# Patient Record
Sex: FEMALE | Race: WHITE | NOT HISPANIC OR LATINO
[De-identification: names, ages, dates, MRNs, and addresses within clinical notes are randomized per-mention and may not be internally consistent; named-entity substitution may affect disease eponyms.]

---

## 2017-11-20 ENCOUNTER — APPOINTMENT (OUTPATIENT)
Dept: UROGYNECOLOGY | Facility: CLINIC | Age: 52
End: 2017-11-20
Payer: COMMERCIAL

## 2017-11-20 VITALS
HEIGHT: 62 IN | BODY MASS INDEX: 36.62 KG/M2 | DIASTOLIC BLOOD PRESSURE: 88 MMHG | SYSTOLIC BLOOD PRESSURE: 160 MMHG | WEIGHT: 199 LBS

## 2017-11-20 LAB
BILIRUB UR QL STRIP: NORMAL
CLARITY UR: CLEAR
COLLECTION METHOD: NORMAL
GLUCOSE UR-MCNC: NORMAL
HCG UR QL: 0.2 EU/DL
HGB UR QL STRIP.AUTO: NORMAL
KETONES UR-MCNC: NORMAL
LEUKOCYTE ESTERASE UR QL STRIP: NORMAL
NITRITE UR QL STRIP: NORMAL
PH UR STRIP: 6
PROT UR STRIP-MCNC: NORMAL
SP GR UR STRIP: 1

## 2017-11-20 PROCEDURE — 51701 INSERT BLADDER CATHETER: CPT

## 2017-11-20 PROCEDURE — 99244 OFF/OP CNSLTJ NEW/EST MOD 40: CPT | Mod: 25

## 2017-11-21 ENCOUNTER — RESULT REVIEW (OUTPATIENT)
Age: 52
End: 2017-11-21

## 2017-11-21 ENCOUNTER — RECORD ABSTRACTING (OUTPATIENT)
Age: 52
End: 2017-11-21

## 2017-11-21 DIAGNOSIS — Z82.49 FAMILY HISTORY OF ISCHEMIC HEART DISEASE AND OTHER DISEASES OF THE CIRCULATORY SYSTEM: ICD-10-CM

## 2017-11-21 DIAGNOSIS — E03.9 HYPOTHYROIDISM, UNSPECIFIED: ICD-10-CM

## 2017-11-21 DIAGNOSIS — B97.7 PAPILLOMAVIRUS AS THE CAUSE OF DISEASES CLASSIFIED ELSEWHERE: ICD-10-CM

## 2017-11-21 DIAGNOSIS — Z83.42 FAMILY HISTORY OF FAMILIAL HYPERCHOLESTEROLEMIA: ICD-10-CM

## 2017-11-21 DIAGNOSIS — Z87.42 PERSONAL HISTORY OF OTHER DISEASES OF THE FEMALE GENITAL TRACT: ICD-10-CM

## 2017-11-21 DIAGNOSIS — Z86.39 PERSONAL HISTORY OF OTHER ENDOCRINE, NUTRITIONAL AND METABOLIC DISEASE: ICD-10-CM

## 2017-11-21 DIAGNOSIS — Z87.440 PERSONAL HISTORY OF URINARY (TRACT) INFECTIONS: ICD-10-CM

## 2017-11-21 DIAGNOSIS — Z83.49 FAMILY HISTORY OF OTHER ENDOCRINE, NUTRITIONAL AND METABOLIC DISEASES: ICD-10-CM

## 2017-11-21 DIAGNOSIS — Z86.19 PERSONAL HISTORY OF OTHER INFECTIOUS AND PARASITIC DISEASES: ICD-10-CM

## 2017-11-21 DIAGNOSIS — Z78.9 OTHER SPECIFIED HEALTH STATUS: ICD-10-CM

## 2017-11-21 LAB
APPEARANCE: CLEAR
BACTERIA: NEGATIVE
BILIRUBIN URINE: NEGATIVE
BLOOD URINE: NEGATIVE
COLOR: ABNORMAL
GLUCOSE QUALITATIVE U: NEGATIVE MG/DL
HYALINE CASTS: 1 /LPF
KETONES URINE: NEGATIVE
LEUKOCYTE ESTERASE URINE: NEGATIVE
MICROSCOPIC-UA: NORMAL
NITRITE URINE: NEGATIVE
PH URINE: 6
PROTEIN URINE: NEGATIVE MG/DL
RED BLOOD CELLS URINE: 2 /HPF
SPECIFIC GRAVITY URINE: 1.01
SQUAMOUS EPITHELIAL CELLS: 1 /HPF
UROBILINOGEN URINE: NEGATIVE MG/DL
WHITE BLOOD CELLS URINE: 0 /HPF

## 2017-11-21 RX ORDER — LEVOTHYROXINE SODIUM 0.05 MG/1
50 TABLET ORAL
Refills: 0 | Status: ACTIVE | COMMUNITY

## 2017-11-22 ENCOUNTER — OUTPATIENT (OUTPATIENT)
Dept: OUTPATIENT SERVICES | Facility: HOSPITAL | Age: 52
LOS: 1 days | End: 2017-11-22
Payer: COMMERCIAL

## 2017-11-22 ENCOUNTER — RESULT REVIEW (OUTPATIENT)
Age: 52
End: 2017-11-22

## 2017-11-22 ENCOUNTER — APPOINTMENT (OUTPATIENT)
Dept: UROGYNECOLOGY | Facility: CLINIC | Age: 52
End: 2017-11-22
Payer: COMMERCIAL

## 2017-11-22 DIAGNOSIS — Z90.49 ACQUIRED ABSENCE OF OTHER SPECIFIED PARTS OF DIGESTIVE TRACT: Chronic | ICD-10-CM

## 2017-11-22 DIAGNOSIS — Z01.818 ENCOUNTER FOR OTHER PREPROCEDURAL EXAMINATION: ICD-10-CM

## 2017-11-22 LAB — BACTERIA UR CULT: NORMAL

## 2017-11-22 PROCEDURE — A4562: CPT

## 2017-11-22 PROCEDURE — 57160 INSERT PESSARY/OTHER DEVICE: CPT

## 2017-11-24 DIAGNOSIS — N39.46 MIXED INCONTINENCE: ICD-10-CM

## 2017-11-24 DIAGNOSIS — N81.11 CYSTOCELE, MIDLINE: ICD-10-CM

## 2017-11-24 DIAGNOSIS — R10.2 PELVIC AND PERINEAL PAIN: ICD-10-CM

## 2017-11-24 DIAGNOSIS — R39.89 OTHER SYMPTOMS AND SIGNS INVOLVING THE GENITOURINARY SYSTEM: ICD-10-CM

## 2017-12-04 ENCOUNTER — APPOINTMENT (OUTPATIENT)
Dept: UROGYNECOLOGY | Facility: CLINIC | Age: 52
End: 2017-12-04
Payer: COMMERCIAL

## 2017-12-04 PROCEDURE — 99214 OFFICE O/P EST MOD 30 MIN: CPT | Mod: GC

## 2018-03-05 ENCOUNTER — APPOINTMENT (OUTPATIENT)
Dept: UROGYNECOLOGY | Facility: CLINIC | Age: 53
End: 2018-03-05
Payer: COMMERCIAL

## 2018-03-05 LAB
BILIRUB UR QL STRIP: NORMAL
CLARITY UR: CLEAR
COLLECTION METHOD: NORMAL
GLUCOSE UR-MCNC: NORMAL
HCG UR QL: 0.2 EU/DL
HGB UR QL STRIP.AUTO: NORMAL
KETONES UR-MCNC: NORMAL
LEUKOCYTE ESTERASE UR QL STRIP: NORMAL
NITRITE UR QL STRIP: NORMAL
PH UR STRIP: 5.5
PROT UR STRIP-MCNC: NORMAL
SP GR UR STRIP: 1.02

## 2018-03-05 PROCEDURE — 99214 OFFICE O/P EST MOD 30 MIN: CPT | Mod: 25

## 2018-03-05 PROCEDURE — 51701 INSERT BLADDER CATHETER: CPT

## 2018-08-20 ENCOUNTER — APPOINTMENT (OUTPATIENT)
Dept: ULTRASOUND IMAGING | Facility: IMAGING CENTER | Age: 53
End: 2018-08-20
Payer: COMMERCIAL

## 2018-08-20 ENCOUNTER — APPOINTMENT (OUTPATIENT)
Dept: UROGYNECOLOGY | Facility: CLINIC | Age: 53
End: 2018-08-20
Payer: COMMERCIAL

## 2018-08-20 ENCOUNTER — OUTPATIENT (OUTPATIENT)
Dept: OUTPATIENT SERVICES | Facility: HOSPITAL | Age: 53
LOS: 1 days | End: 2018-08-20
Payer: COMMERCIAL

## 2018-08-20 DIAGNOSIS — Z90.49 ACQUIRED ABSENCE OF OTHER SPECIFIED PARTS OF DIGESTIVE TRACT: Chronic | ICD-10-CM

## 2018-08-20 DIAGNOSIS — R31.0 GROSS HEMATURIA: ICD-10-CM

## 2018-08-20 DIAGNOSIS — N93.9 ABNORMAL UTERINE AND VAGINAL BLEEDING, UNSPECIFIED: ICD-10-CM

## 2018-08-20 DIAGNOSIS — R30.0 DYSURIA: ICD-10-CM

## 2018-08-20 PROCEDURE — 76770 US EXAM ABDO BACK WALL COMP: CPT | Mod: 26

## 2018-08-20 PROCEDURE — 99214 OFFICE O/P EST MOD 30 MIN: CPT | Mod: 25

## 2018-08-20 PROCEDURE — 51701 INSERT BLADDER CATHETER: CPT

## 2018-08-20 PROCEDURE — 76770 US EXAM ABDO BACK WALL COMP: CPT

## 2018-08-21 ENCOUNTER — RESULT REVIEW (OUTPATIENT)
Age: 53
End: 2018-08-21

## 2018-08-21 LAB
APPEARANCE: CLEAR
BACTERIA: NEGATIVE
BILIRUBIN URINE: NEGATIVE
BLOOD URINE: NEGATIVE
COLOR: YELLOW
GLUCOSE QUALITATIVE U: NEGATIVE MG/DL
HYALINE CASTS: 1 /LPF
KETONES URINE: NEGATIVE
LEUKOCYTE ESTERASE URINE: NEGATIVE
MICROSCOPIC-UA: NORMAL
NITRITE URINE: NEGATIVE
PH URINE: 6.5
PROTEIN URINE: NEGATIVE MG/DL
RED BLOOD CELLS URINE: 0 /HPF
SPECIFIC GRAVITY URINE: 1.01
SQUAMOUS EPITHELIAL CELLS: 1 /HPF
UROBILINOGEN URINE: NEGATIVE MG/DL
WHITE BLOOD CELLS URINE: 0 /HPF

## 2018-08-22 ENCOUNTER — RESULT REVIEW (OUTPATIENT)
Age: 53
End: 2018-08-22

## 2018-08-22 LAB — BACTERIA UR CULT: NORMAL

## 2018-10-02 ENCOUNTER — APPOINTMENT (OUTPATIENT)
Dept: UROGYNECOLOGY | Facility: CLINIC | Age: 53
End: 2018-10-02

## 2018-12-10 ENCOUNTER — RESULT REVIEW (OUTPATIENT)
Age: 53
End: 2018-12-10

## 2018-12-10 ENCOUNTER — OUTPATIENT (OUTPATIENT)
Dept: OUTPATIENT SERVICES | Facility: HOSPITAL | Age: 53
LOS: 1 days | End: 2018-12-10
Payer: COMMERCIAL

## 2018-12-10 DIAGNOSIS — Z90.49 ACQUIRED ABSENCE OF OTHER SPECIFIED PARTS OF DIGESTIVE TRACT: Chronic | ICD-10-CM

## 2018-12-10 DIAGNOSIS — D37.1 NEOPLASM OF UNCERTAIN BEHAVIOR OF STOMACH: ICD-10-CM

## 2018-12-10 PROCEDURE — 88173 CYTOPATH EVAL FNA REPORT: CPT | Mod: 26

## 2018-12-10 PROCEDURE — 43239 EGD BIOPSY SINGLE/MULTIPLE: CPT

## 2018-12-10 PROCEDURE — 88173 CYTOPATH EVAL FNA REPORT: CPT

## 2018-12-12 LAB — SURGICAL PATHOLOGY STUDY: SIGNIFICANT CHANGE UP

## 2018-12-13 LAB — NON-GYNECOLOGICAL CYTOLOGY STUDY: SIGNIFICANT CHANGE UP

## 2019-12-10 ENCOUNTER — APPOINTMENT (OUTPATIENT)
Dept: UROGYNECOLOGY | Facility: CLINIC | Age: 54
End: 2019-12-10
Payer: COMMERCIAL

## 2019-12-10 DIAGNOSIS — R10.2 PELVIC AND PERINEAL PAIN: ICD-10-CM

## 2019-12-10 DIAGNOSIS — R10.32 LEFT LOWER QUADRANT PAIN: ICD-10-CM

## 2019-12-10 PROCEDURE — 99214 OFFICE O/P EST MOD 30 MIN: CPT

## 2019-12-10 NOTE — PHYSICAL EXAM
[Well developed] : well developed [No Acute Distress] : in no acute distress [Well Nourished] : ~L well nourished [Oriented x3] : oriented to person, place, and time [Normal Mood/Affect] : mood and affect are normal [Normal Memory] : ~T memory was ~L unimpaired [Tenderness] : ~T ~M abdominal tenderness observed [LLQ] : in the left lower quadrant [Normal Gait] : gait was normal [Warm and Dry] : was warm and dry to touch [Labia Minora] : were normal [Labia Majora] : were normal [Normal Appearance] : general appearance was normal [No Bleeding] : there was no active vaginal bleeding [Soft] :  the cervix was soft [Normal] : the sensory exam was normal [Distended] : not distended [Hernia] : no hernia observed [Rigid] : not rigid [Firm] : not firm [Rebound] : no rebound [Guarding] : no guarding [Inguinal Hernia Right] : no inguinal hernia on the right [Inguinal Hernia Left] : no inguinal hernia on the left [Inguinal LAD] : no adenopathy was noted in the inguinal lymph nodes [FreeTextEntry4] : Pessary removed and cleaned, noted to be too small. Will increase size [de-identified] : Nontender

## 2019-12-10 NOTE — PROCEDURE
[FreeTextEntry1] : RS#5 refitted with #6. Pessary given to her, not inserted in preparation for sonogram

## 2019-12-10 NOTE — DISCUSSION/SUMMARY
[FreeTextEntry1] : \par 55yo with pelvic pressure and discomfort after lifting, likely musculoskeletal strain. Symptoms are unlikely to be related to her prolapse as there was no change with prolapse reduction with pessary insertion. She has mild LLQ pain on exam. No rebound or guarding.  \par \par 1. LLQ Pain, diffuse lower pelvic pain\par -Pelvic sonogram-transvaginal sonogram and abdominal sonogram. Rx provided. WIll call with results\par -If sonogram negative, recommend f/u with PCP as unlikely GYN source\par \par 2. Prolapse:\par -continues to be managed well with pessary. Given larger ring with support to insert once sonogram complete. She will continue to use it as needed.

## 2019-12-10 NOTE — HISTORY OF PRESENT ILLNESS
[Feelings Of Urinary Urgency] : none [Urinary Frequency] : none [Urinary Tract Infection] : none [Pain During Urination (Dysuria)] : none [Pelvic Pain] : none [Vaginal Pain] : daily [] : days ago [FreeTextEntry1] : \par 55yo with history of pelvic organ prolapse managed with a RS#5 that she inserts PRN. Patient presents with lower abdominal/pelvic pressure and discomfort for 1wk. She states she lifted a 24lb turkey for Thanksgiving and since then she has had this pressure, which also radiates to her back. Patient went to a urgent care center and was treated empirically for a UTI, however culture came back negative. She inserted her pessary once the pressure started, and noted no change in symptoms with prolapse reduction. Reports pessary is fitting well, no prolapse past pessary, no problems emptying bladder.

## 2019-12-11 ENCOUNTER — APPOINTMENT (OUTPATIENT)
Dept: ULTRASOUND IMAGING | Facility: CLINIC | Age: 54
End: 2019-12-11

## 2020-02-14 ENCOUNTER — OUTPATIENT (OUTPATIENT)
Dept: OUTPATIENT SERVICES | Facility: HOSPITAL | Age: 55
LOS: 1 days | End: 2020-02-14
Payer: COMMERCIAL

## 2020-02-14 ENCOUNTER — APPOINTMENT (OUTPATIENT)
Dept: UROGYNECOLOGY | Facility: CLINIC | Age: 55
End: 2020-02-14
Payer: COMMERCIAL

## 2020-02-14 ENCOUNTER — TRANSCRIPTION ENCOUNTER (OUTPATIENT)
Age: 55
End: 2020-02-14

## 2020-02-14 DIAGNOSIS — Z01.818 ENCOUNTER FOR OTHER PREPROCEDURAL EXAMINATION: ICD-10-CM

## 2020-02-14 DIAGNOSIS — N39.3 STRESS INCONTINENCE (FEMALE) (MALE): ICD-10-CM

## 2020-02-14 DIAGNOSIS — R39.15 URGENCY OF URINATION: ICD-10-CM

## 2020-02-14 DIAGNOSIS — Z90.49 ACQUIRED ABSENCE OF OTHER SPECIFIED PARTS OF DIGESTIVE TRACT: Chronic | ICD-10-CM

## 2020-02-14 DIAGNOSIS — R39.89 OTHER SYMPTOMS AND SIGNS INVOLVING THE GENITOURINARY SYSTEM: ICD-10-CM

## 2020-02-14 PROCEDURE — 52000 CYSTOURETHROSCOPY: CPT

## 2020-02-14 PROCEDURE — 99214 OFFICE O/P EST MOD 30 MIN: CPT | Mod: 25

## 2020-02-14 RX ORDER — SOLIFENACIN SUCCINATE 5 MG/1
5 TABLET ORAL
Qty: 30 | Refills: 5 | Status: ACTIVE | COMMUNITY
Start: 2020-02-14 | End: 1900-01-01

## 2020-02-14 NOTE — PHYSICAL EXAM
[Oriented x3] : oriented to person, place, and time [No Acute Distress] : in no acute distress [Normal Mood/Affect] : mood and affect are normal [Normal Memory] : ~T memory was ~L unimpaired [Warm and Dry] : was warm and dry to touch [Normal Gait] : gait was normal [Labia Majora] : were normal [Vulvar Atrophy] : vulvar atrophy [Labia Minora] : were normal [Normal Appearance] : general appearance was normal [Atrophy] : atrophy [No Bleeding] : there was no active vaginal bleeding [Aa ____] : Aa [unfilled] [Ba ____] : Ba [unfilled] [Normal] : no abnormalities [Distended] : not distended [Tenderness] : ~T no ~M abdominal tenderness observed [de-identified] : Udip negative. supine CST: positive, patient had large volume leakage with straining with bladder filled to 200 cc [Inguinal LAD] : no adenopathy was noted in the inguinal lymph nodes

## 2020-02-14 NOTE — HISTORY OF PRESENT ILLNESS
[Cystocele (Obstetric)] : frequent [Rectal Prolapse] : none [Unable To Restrain Bowel Movement] : frequent [x2] : two times a night [Feelings Of Urinary Urgency] : daily [] : years ago [Urinary Tract Infection] : none [de-identified] : more bothersome [de-identified] : 2-3 [FreeTextEntry1] : Anastasiia presents for follow up. See procedure note for cystoscopy details. She has a h/o pelvic pain and bladder pressure. She has a mild cystocele and in past her pressure symptoms have improved with prolapse reduction with a pessary. She reports worsening bladder pressure and "cramping" which has not been resolved by pessary. She also symptoms of mixed urinary incontinence with predominant urgency component. \par \par daily fluid intake: 16 oz coffee, 32 oz water

## 2020-02-14 NOTE — DISCUSSION/SUMMARY
[FreeTextEntry1] : We reviewed her exam findings and symptoms and agreed on following plan:\par -Start behavioral and fluid modifications, written instructions provided\par -Start trial of Vesicare 5 mg daily, Rx provided today. Risks and side effects reviewed\par -RTO in 6-8 weeks for follow up. If symptoms persist, will obtain urodynamic testing.\par \par The following was provided to her in written form and reviewed extensively. Patient was given a copy to take home: \par For Urinary Symptoms:\par **Total fluids: 34-50 oz (1-1.5 Liters) per day\par   -Ex. 8 oz coffee or tea (NOT BOTH!), 2-3 bottles of water (Each bottle is 16.9 oz). No flavoring added. No seltzer or Pelligrino!\par  -Drink slowly throughout day, no binge drinking (each bottle of water should take you hours, not minutes)\par **Avoid: coffee, tea, alcohol, carbonation (soda, seltzer), spicy foods, citrus, artificial sweeteners, chocolate\par **Stop eating and drinking 2-3 hours before bedtime (sips ok)\par \par -Try the above fluid changes and start Vesicare (solifenacin) 5 mg daily. You must continue the fluid changes while on medication. Medication may take 3-4 weeks to start working. Common side effects include: dry mouth, dry eyes, constipation. If side effects try to manage first:\par Dry eyes: lubricating eye drops\par Dry mouth: biotene mouth wash\par Constipation: Colace 100 mg 1-2 times daily\par \par If not covered by insurance, call insurance company and get list of overactive bladder medications that are covered. Call my office with list (Mon-Fri) and we will change medication\par

## 2020-04-06 ENCOUNTER — APPOINTMENT (OUTPATIENT)
Dept: UROGYNECOLOGY | Facility: CLINIC | Age: 55
End: 2020-04-06
Payer: COMMERCIAL

## 2020-04-06 DIAGNOSIS — N39.46 MIXED INCONTINENCE: ICD-10-CM

## 2020-04-08 NOTE — HISTORY OF PRESENT ILLNESS
[Home] : at home, [unfilled] , at the time of the visit. [Other Location: e.g. Home (Enter Location, City,State)___] : at [unfilled] [Patient] : the patient [Cystocele (Obstetric)] : frequent [Rectal Prolapse] : none [Unable To Restrain Bowel Movement] : frequent [Feelings Of Urinary Urgency] : daily [x2] : two times a night [] : years ago [Urinary Tract Infection] : none [de-identified] : more bothersome [de-identified] : 2-3 [FreeTextEntry1] : \par Anastasiia presents for follow up. She has a h/o pelvic pain and bladder pressure. She has a mild cystocele and in past her pressure symptoms have improved with prolapse reduction with a pessary. At her last visit  she reported symptoms of mixed urinary incontinence with predominant urgency component as well as cramping discomfort. She was started on behavioral and fluid modifications and a trial of Vesicare 5 mg daily. Today she reports  ????\par \par Of note, she tested positive for COVID. She reports mild symptoms and denies shortness of breath and dyspnea.

## 2020-04-08 NOTE — DISCUSSION/SUMMARY
[FreeTextEntry1] : We reviewed her exam findings and symptoms and agreed on following plan:\par -Start behavioral and fluid modifications, written instructions provided\par -Start trial of Vesicare 5 mg daily, Rx provided today. Risks and side effects reviewed\par -RTO in 6-8 weeks for follow up. If symptoms persist, will obtain urodynamic testing.\par \par

## 2020-04-10 ENCOUNTER — APPOINTMENT (OUTPATIENT)
Dept: UROGYNECOLOGY | Facility: CLINIC | Age: 55
End: 2020-04-10

## 2020-04-17 ENCOUNTER — APPOINTMENT (OUTPATIENT)
Dept: UROGYNECOLOGY | Facility: CLINIC | Age: 55
End: 2020-04-17
Payer: COMMERCIAL

## 2020-04-17 DIAGNOSIS — N39.41 URGE INCONTINENCE: ICD-10-CM

## 2020-04-17 DIAGNOSIS — N81.11 CYSTOCELE, MIDLINE: ICD-10-CM

## 2020-04-17 DIAGNOSIS — N32.81 OVERACTIVE BLADDER: ICD-10-CM

## 2020-04-17 PROCEDURE — 99213 OFFICE O/P EST LOW 20 MIN: CPT | Mod: 95

## 2020-04-17 RX ORDER — SOLIFENACIN SUCCINATE 5 MG/1
5 TABLET ORAL
Qty: 30 | Refills: 5 | Status: ACTIVE | COMMUNITY
Start: 2020-04-17 | End: 1900-01-01

## 2020-04-17 NOTE — HISTORY OF PRESENT ILLNESS
[Home] : at home, [unfilled] , at the time of the visit. [Other Location: e.g. Home (Enter Location, City,State)___] : at [unfilled] [Patient] : the patient [Rectal Prolapse] : none [] : years ago [Urinary Tract Infection] : none [Cystocele (Obstetric)] : none [Unable To Restrain Bowel Movement] : rare [Feelings Of Urinary Urgency] : rare [de-identified] : improved [de-identified] : significantly improved with solifenacin [de-identified] : significantly improved with solifenacin [FreeTextEntry1] : \stephanie Laurent presents for follow up. She has a h/o pelvic pain and bladder pressure. She has a mild cystocele and in past her pressure symptoms have improved with prolapse reduction with a pessary. She inserts it as needed for prolapse symptoms. At her last visit  she reported symptoms of mixed urinary incontinence with predominant urgency component as well as cramping discomfort. She was started on behavioral and fluid modifications and a trial of Solifenacin 5 mg daily. Today she reports  significant improvement in her symptoms with the medication. She had to stop it recently when diagnosed with COVID. She reported flu-like symptoms and denies SOB. She is feeling much better. She reports dry mouth with the medication, however not bothersome.\stephanie

## 2021-01-26 ENCOUNTER — RESULT REVIEW (OUTPATIENT)
Age: 56
End: 2021-01-26

## 2022-01-20 ENCOUNTER — RESULT REVIEW (OUTPATIENT)
Age: 57
End: 2022-01-20

## 2022-01-25 ENCOUNTER — OUTPATIENT (OUTPATIENT)
Dept: OUTPATIENT SERVICES | Facility: HOSPITAL | Age: 57
LOS: 1 days | End: 2022-01-25
Payer: COMMERCIAL

## 2022-01-25 VITALS
RESPIRATION RATE: 16 BRPM | TEMPERATURE: 97 F | HEART RATE: 77 BPM | SYSTOLIC BLOOD PRESSURE: 142 MMHG | OXYGEN SATURATION: 98 % | DIASTOLIC BLOOD PRESSURE: 70 MMHG | HEIGHT: 61.5 IN | WEIGHT: 203.05 LBS

## 2022-01-25 DIAGNOSIS — N84.0 POLYP OF CORPUS UTERI: ICD-10-CM

## 2022-01-25 DIAGNOSIS — E03.9 HYPOTHYROIDISM, UNSPECIFIED: ICD-10-CM

## 2022-01-25 DIAGNOSIS — Z90.49 ACQUIRED ABSENCE OF OTHER SPECIFIED PARTS OF DIGESTIVE TRACT: Chronic | ICD-10-CM

## 2022-01-25 DIAGNOSIS — Z98.890 OTHER SPECIFIED POSTPROCEDURAL STATES: Chronic | ICD-10-CM

## 2022-01-25 DIAGNOSIS — E66.9 OBESITY, UNSPECIFIED: ICD-10-CM

## 2022-01-25 LAB
ANION GAP SERPL CALC-SCNC: 12 MMOL/L — SIGNIFICANT CHANGE UP (ref 7–14)
BUN SERPL-MCNC: 13 MG/DL — SIGNIFICANT CHANGE UP (ref 7–23)
CALCIUM SERPL-MCNC: 9.2 MG/DL — SIGNIFICANT CHANGE UP (ref 8.4–10.5)
CHLORIDE SERPL-SCNC: 100 MMOL/L — SIGNIFICANT CHANGE UP (ref 98–107)
CO2 SERPL-SCNC: 24 MMOL/L — SIGNIFICANT CHANGE UP (ref 22–31)
CREAT SERPL-MCNC: 0.63 MG/DL — SIGNIFICANT CHANGE UP (ref 0.5–1.3)
GLUCOSE SERPL-MCNC: 133 MG/DL — HIGH (ref 70–99)
HCT VFR BLD CALC: 39.7 % — SIGNIFICANT CHANGE UP (ref 34.5–45)
HGB BLD-MCNC: 12.8 G/DL — SIGNIFICANT CHANGE UP (ref 11.5–15.5)
MCHC RBC-ENTMCNC: 28.6 PG — SIGNIFICANT CHANGE UP (ref 27–34)
MCHC RBC-ENTMCNC: 32.2 GM/DL — SIGNIFICANT CHANGE UP (ref 32–36)
MCV RBC AUTO: 88.6 FL — SIGNIFICANT CHANGE UP (ref 80–100)
NRBC # BLD: 0 /100 WBCS — SIGNIFICANT CHANGE UP
NRBC # FLD: 0 K/UL — SIGNIFICANT CHANGE UP
PLATELET # BLD AUTO: 320 K/UL — SIGNIFICANT CHANGE UP (ref 150–400)
POTASSIUM SERPL-MCNC: 3.6 MMOL/L — SIGNIFICANT CHANGE UP (ref 3.5–5.3)
POTASSIUM SERPL-SCNC: 3.6 MMOL/L — SIGNIFICANT CHANGE UP (ref 3.5–5.3)
RBC # BLD: 4.48 M/UL — SIGNIFICANT CHANGE UP (ref 3.8–5.2)
RBC # FLD: 13.3 % — SIGNIFICANT CHANGE UP (ref 10.3–14.5)
SODIUM SERPL-SCNC: 136 MMOL/L — SIGNIFICANT CHANGE UP (ref 135–145)
WBC # BLD: 8.67 K/UL — SIGNIFICANT CHANGE UP (ref 3.8–10.5)
WBC # FLD AUTO: 8.67 K/UL — SIGNIFICANT CHANGE UP (ref 3.8–10.5)

## 2022-01-25 PROCEDURE — 93010 ELECTROCARDIOGRAM REPORT: CPT

## 2022-01-25 RX ORDER — SODIUM CHLORIDE 9 MG/ML
1000 INJECTION, SOLUTION INTRAVENOUS
Refills: 0 | Status: DISCONTINUED | OUTPATIENT
Start: 2022-02-02 | End: 2022-02-16

## 2022-01-25 NOTE — H&P PST ADULT - PROBLEM SELECTOR PLAN 2
Pt instructed to take hypothyroidism AM of surgery with a sip of water, pt able to verbalize understanding.

## 2022-01-25 NOTE — H&P PST ADULT - LYMPHATICS COMMENTS
Subjective   Patient ID: Kilo is a 52 year old male.    Chief Complaint   Patient presents with   • Follow-up     HPI    52M pmhx MS with right handed weakness, presenting for mole biopsy.    Mole on back:   -first noticed it a few months ago  -has not noticed a change in size   -does not go out in the sun too often   -denies personal or family hx of skin cancer   -not bothering in any other way    Also asking for skin tag removal as they are irritated/inflammed under right and left arm for some time now  -not using any blood thinners  -No hx of diabetes    Patient's medications, allergies, past medical, surgical, social and family histories were reviewed and updated as appropriate    Review of Systems   Constitutional: Negative for chills and fever.   HENT: Negative for congestion and sore throat.    Eyes: Negative for pain, discharge and redness.   Respiratory: Negative for cough and shortness of breath.    Cardiovascular: Negative for chest pain, palpitations and orthopnea.   Gastrointestinal: Negative for abdominal pain, heartburn, nausea and vomiting.   Genitourinary: Negative for dysuria.   Musculoskeletal: Negative for myalgias.   Skin: Negative for rash.   Neurological: Negative for dizziness and headaches.       Objective   Physical Exam   Constitutional: He is oriented to person, place, and time. He appears well-developed and well-nourished.   HENT:   Head: Normocephalic and atraumatic.   Right Ear: External ear normal.   Left Ear: External ear normal.   Nose: Nose normal.   Mouth/Throat: Oropharynx is clear and moist.   Eyes: Pupils are equal, round, and reactive to light. Conjunctivae and EOM are normal.   Neck: Normal range of motion. Neck supple.   Cardiovascular: Normal rate, regular rhythm and normal heart sounds.   Pulmonary/Chest: Effort normal and breath sounds normal.   Musculoskeletal: Normal range of motion.   Neurological: He is alert and oriented to person, place, and time.   Skin: Skin is  warm.        Psychiatric: He has a normal mood and affect.         PROCEDURE NOTE  Punch Excisional Biopsy  Indication: Atypical Mole     Risks and Benefits:  Possible treatment options were discussed with the patient.  The risks and benefits of the procedure was discussed. Risks include but are not limited to fever, bleeding, pain, failure to capture entire lesion or worsening pain, scar. The patient understands these risks and wishes to proceed with the treatment. There were no assurances or guarantees given as to the results of the excisional biopsy.      Procedure: The patient was appropriately positioned.  The injection site was prepped with betadine.  The anesthetic needle was used to numb the area. 0.5 ml of  1% lidocaine with epi (lidocaine LOT: -ev, exp 1/08/2019; NDC: 9368-6398-91) was injected. Using a 6mm punch  incision was made. Lesion was successfully excised and placed in formalin. Using 4-0 nylon suture, 2 stiches were placed. A bandaid was applied at the end of the procedure.       The patient tolerated the procedure well. Patient was instructed to watch for redness, swelling, or increase in pain and call the clinic if these occur.       PROCEDURE NOTE  Skin tag removal  Indication: irritated skin tags     Risks and Benefits:  Possible treatment options were discussed with the patient.  The risks and benefits of the procedure was discussed. Risks include but are not limited to fever, bleeding, pain, failure to capture entire lesion or worsening pain, scar. The patient understands these risks and wishes to proceed with the treatment. There were no assurances or guarantees given as to the results of the skin tag removal.      Procedure: The patient was appropriately positioned.  The injection site was prepped with alcohol.  Using a scissors, 14 lesions were successfully excised. Hemostasis was achieved without intervention except 2 where silver nitrate was used without complication.      The  patient tolerated the procedure well. Patient was instructed to watch for redness, swelling, or increase in pain and call the clinic if these occur.       Patient Active Problem List   Diagnosis   • Blepharospasm   • Skin lesion of back   • BPH (benign prostatic hyperplasia)   • Depression   • Dysarthria on examination   • Multiple sclerosis (CMS/HCC)   • Inflamed skin tag       Assessment   Problem List Items Addressed This Visit        Integumentary    Skin lesion of back - Primary     Biopsy as above         Relevant Orders    SURGICAL PATHOLOGY       Oncologic    Inflamed skin tag     See procedure note             Medical compliance with plan discussed and risks of non-compliance reviewed.   Patient education completed on disease process, etiology & prognosis.   Patient expresses understanding of the plan.   Proper usage and side effects of medications reviewed & discussed.   Return to clinic as clinically indicated (worsening or new symptoms) as discussed with patient who verbalized understanding of & agreement with the plan     Schedule follow up: in 1 week for cerumen impaction removal and stitch removal   No palpable anterior cervical lymph nodes.

## 2022-01-25 NOTE — H&P PST ADULT - HISTORY OF PRESENT ILLNESS
55 yo male with preop dx. polyp of corpus uteri presents to pre surgical testing.  Pt reports PMB x 1 year, US revealing uterine polyp.  Pt is scheduled for hysteroscopy dilation and curettage symphion.

## 2022-01-25 NOTE — H&P PST ADULT - NSICDXFAMILYHX_GEN_ALL_CORE_FT
FAMILY HISTORY:  Father  Still living? Yes, Estimated age: 71-80  Family history of hyperlipidemia, Age at diagnosis: Age Unknown  Family history of hypertension, Age at diagnosis: Age Unknown

## 2022-01-25 NOTE — H&P PST ADULT - NSICDXPASTMEDICALHX_GEN_ALL_CORE_FT
PAST MEDICAL HISTORY:  Hypothyroidism, unspecified type     Obesity     Urinary tract infection last 3/2016 with symptoms    Vitamin D deficiency

## 2022-01-25 NOTE — H&P PST ADULT - NSANTHOSAYNRD_GEN_A_CORE
No. TAMMIE screening performed.  STOP BANG Legend: 0-2 = LOW Risk; 3-4 = INTERMEDIATE Risk; 5-8 = HIGH Risk

## 2022-01-25 NOTE — H&P PST ADULT - PROBLEM SELECTOR PLAN 1
[facility]  Verbal and written pre op instructions reviewed with patient and pt able to verbalize understanding.   Pt instructed to follow surgeon's guidelines regarding COVID testing preop.

## 2022-02-01 ENCOUNTER — TRANSCRIPTION ENCOUNTER (OUTPATIENT)
Age: 57
End: 2022-02-01

## 2022-02-01 NOTE — ASU PATIENT PROFILE, ADULT - FALL HARM RISK - UNIVERSAL INTERVENTIONS
Bed in lowest position, wheels locked, appropriate side rails in place/Call bell, personal items and telephone in reach/Instruct patient to call for assistance before getting out of bed or chair/Non-slip footwear when patient is out of bed/Serena to call system/Physically safe environment - no spills, clutter or unnecessary equipment/Purposeful Proactive Rounding/Room/bathroom lighting operational, light cord in reach

## 2022-02-02 ENCOUNTER — RESULT REVIEW (OUTPATIENT)
Age: 57
End: 2022-02-02

## 2022-02-02 ENCOUNTER — OUTPATIENT (OUTPATIENT)
Dept: OUTPATIENT SERVICES | Facility: HOSPITAL | Age: 57
LOS: 1 days | Discharge: HOME | End: 2022-02-02
Payer: COMMERCIAL

## 2022-02-02 VITALS
HEART RATE: 83 BPM | WEIGHT: 203.05 LBS | HEIGHT: 61.5 IN | OXYGEN SATURATION: 100 % | RESPIRATION RATE: 16 BRPM | DIASTOLIC BLOOD PRESSURE: 91 MMHG | SYSTOLIC BLOOD PRESSURE: 140 MMHG | TEMPERATURE: 99 F

## 2022-02-02 VITALS
DIASTOLIC BLOOD PRESSURE: 50 MMHG | RESPIRATION RATE: 17 BRPM | OXYGEN SATURATION: 97 % | HEART RATE: 64 BPM | SYSTOLIC BLOOD PRESSURE: 125 MMHG

## 2022-02-02 DIAGNOSIS — N84.0 POLYP OF CORPUS UTERI: ICD-10-CM

## 2022-02-02 DIAGNOSIS — Z90.49 ACQUIRED ABSENCE OF OTHER SPECIFIED PARTS OF DIGESTIVE TRACT: Chronic | ICD-10-CM

## 2022-02-02 DIAGNOSIS — Z98.890 OTHER SPECIFIED POSTPROCEDURAL STATES: Chronic | ICD-10-CM

## 2022-02-02 PROCEDURE — 88305 TISSUE EXAM BY PATHOLOGIST: CPT | Mod: 26

## 2022-02-02 RX ORDER — LEVOTHYROXINE SODIUM 125 MCG
1 TABLET ORAL
Qty: 0 | Refills: 0 | DISCHARGE

## 2022-02-02 NOTE — ASU DISCHARGE PLAN (ADULT/PEDIATRIC) - CARE PROVIDER_API CALL
Lisa Olvera  OBSTETRICS AND GYNECOLOGY  [address], [address]  [place] [address]  Phone: [phone]  Fax: [phone]  Follow Up Time: 2 weeks

## 2022-02-02 NOTE — ASU DISCHARGE PLAN (ADULT/PEDIATRIC) - POST OP PHONE #
10/17/2019        RE: Aleena Ontiveros  [address]  [facility] [address]        [facility]  PRIMARY CARE PROVIDER AND CLINIC:  Arabella Conner MD, [facility] [address] / [place]*  Chief Complaint   Patient presents with     Hospital F/U     [facility] Medical Record Number:  [id number]  Place of Service where encounter took place:  [facility] [[id number]]    Aleena Ontiveros  is a 60 year old  (1959), returned to the above facility from  [facility]. Hospital stay 10/15/19 - 10/16/19. .  Admitted to this facility for  rehab, medical management and nursing care.    HPI:    HPI information obtained from: facility chart records, facility staff, patient report and [facility] chart review.   Brief Summary of Hospital Course:   60-year-old female h/o HTN, hypothyroidism, mild intermittent asthma, adolescent idiopathic scoliosis status post lumbar fusion of T4 to L4 with solid arthrodesis and retained Carpio rods x2 performed at the age of 15 and recent transforaminal lumbar interbody fusion, 3 column osteotomy from L5 to S1 and posterior spinal fusion from L2 to the pelvis performed on 10/01 to hospital for observation with Hypotension and UTI. BPs improved with IV fluids, lisinopril held then restarted with parameters. Treated for UTI with IV zosyn then ceftriaxone - discharged on four day course of bactrim, final results pending. NASREEN cr 1.16 from previous level of 0.61, resolved with fluids. Na down 131, improved to 137 at discharge. Pain managed with tylenol, gabapentin, oxycodone, PRN flexeril. Post op anemia Hgb down to 9.1, improved to 11.  S/P thyroidectomy on synthroid 125 mcg. Asthma managed with Advair and Singulair, allegra and Flonase. Now back to TCU for therapies.     Updates on Status Since Skilled nursing Admission:   Patient seen for initial TCU visit today. She reports she is depressed and  tearful about her worsened mobility and slow progress. Agreeable to ACP consult in house. She has had a low grade temp today and is still on Bactrim. UC final results not yet available from hospital. She denies headaches, dizziness, chest pain, bowel symptoms. Reports unable to sit up unassisted - has been doing exercises in bed. Feels leg are heavy, painful and tingly since surgery. She is upset about missing important work deadlines while out recovering. On EMR review note SBP range 110-134, HR 80-90s, sats 97% on room air.     CODE STATUS/ADVANCE DIRECTIVES DISCUSSION:   CPR/Full code   Patient's living condition: lives with spouse  ALLERGIES: Adhesive tape and Erythromycin  PAST MEDICAL HISTORY:  has a past medical history of Anxiety, Arthritis, Asthma, Chronic osteoarthritis, Hypertension, PONV (postoperative nausea and vomiting), Thyroid disease, and Uncomplicated asthma. She also has no past medical history of Difficult intubation, Malignant hyperthermia, or Spinal headache.  PAST SURGICAL HISTORY:   has a past surgical history that includes Abdomen surgery (1988,1999); orthopedic surgery; Thyroidectomy (5/13/2014); ENT surgery; back surgery; GYN surgery (5-13-13); thumb mass resection; Arthroplasty knee (Right, 8/10/2017); Arthroplasty knee (Left, 12/13/2017); and Optical tracking system fusion posterior spine thoracic three+ levels (N/A, 10/1/2019).  FAMILY HISTORY: family history includes Breast Cancer in her mother; C.A.D. in her father; Cancer - colorectal in her father; Cerebrovascular Disease in her father; Thyroid Disease in her brother and sister.  SOCIAL HISTORY:   reports that she has never smoked. She has never used smokeless tobacco. She reports current alcohol use. She reports that she does not use drugs.    Post Discharge Medication Reconciliation Status: discharge medications reconciled, continue medications without change    Current Outpatient Medications   Medication Sig Dispense Refill      acetaminophen (TYLENOL) 500 MG tablet Take 1,000 mg by mouth every 6 hours as needed for mild pain       albuterol (PROAIR HFA/PROVENTIL HFA/VENTOLIN HFA) 108 (90 Base) MCG/ACT inhaler Inhale 2 puffs into the lungs every 4 hours as needed for shortness of breath / dyspnea or wheezing       cyclobenzaprine (FLEXERIL) 10 MG tablet Take 1 tablet (10 mg) by mouth 3 times daily as needed for muscle spasms 90 tablet 1     enoxaparin (LOVENOX) 40 MG/0.4ML syringe Inject 0.4 mLs (40 mg) Subcutaneous every 24 hours for 28 days Continue for 28 days or until pt is more mobile, whichever comes first.       estradiol (ESTRACE) 0.1 MG/GM cream Place 2 g vaginally At Bedtime        Fexofenadine HCl (ALLEGRA PO) Take 180 mg by mouth every morning        fluticasone (FLONASE) 50 MCG/ACT spray Spray 1 spray into both nostrils 2 times daily        fluticasone-salmeterol (ADVAIR) 500-50 MCG/DOSE inhaler Inhale 1 puff into the lungs every 12 hours       gabapentin (NEURONTIN) 400 MG capsule Take 2 capsules (800 mg) by mouth 3 times daily       gabapentin (NEURONTIN) 400 MG capsule Take 3 capsules (1,200 mg) by mouth At Bedtime Give at 11PM       levothyroxine (SYNTHROID) 125 MCG tablet Take 1 tablet (125 mcg) by mouth every morning       lisinopril (PRINIVIL/ZESTRIL) 40 MG tablet Take 1 tablet (40 mg) by mouth daily .  Hold for systolic blood pressure less than 110.       melatonin 5 MG tablet Take 1 tablet (5 mg) by mouth At Bedtime       Montelukast Sodium (SINGULAIR PO) Take 10 mg by mouth At Bedtime        order for DME Equipment being ordered: TENS.    TENS UNIT Ordered- Please call your insurance to Verify coverage and locations to  the device.     Please Dispense Device, leads, pads, wires, and all other necessary equipment that is needed for use.     Length of need: 36 months. 1 Device 0     oxyCODONE (ROXICODONE) 5 MG tablet Take 1-2 tablets (5-10 mg) by mouth every 3 hours as needed for moderate to severe pain 10 tablet  "0     polyethylene glycol (MIRALAX/GLYCOLAX) packet Take 1 packet by mouth daily as needed for constipation       senna-docusate (SENOKOT-S/PERICOLACE) 8.6-50 MG tablet Take 2 tablets by mouth 2 times daily as needed for constipation       sulfamethoxazole-trimethoprim (BACTRIM DS/SEPTRA DS) 800-160 MG tablet Take 1 tablet by mouth 2 times daily for 4 days         ROS:  10 point ROS of systems including Constitutional, Eyes, Respiratory, Cardiovascular, Gastroenterology, Genitourinary, Integumentary, Musculoskeletal, Psychiatric were all negative except for pertinent positives noted in my HPI.    Vitals:  /83   Pulse 93   Temp 99  F (37.2  C)   Resp 16   Ht 1.575 m (5' 2\")   Wt 70.3 kg (155 lb)   SpO2 97%   BMI 28.35 kg/m     Exam:  GENERAL APPEARANCE:  Alert, in no distress, pleasant, cooperative, oriented x 4  EYES:  EOM, lids, pupils and irises normal, sclera clear and conjunctiva normal, no discharge or mattering on lids or lashes noted  ENT:  Mouth normal, moist mucous membranes, nose normal without drainage or crusting, external ears without lesions, hearing acuity intact  NECK: symmetrical, trachea midline  RESP:  respiratory effort normal, no chest wall tenderness, no respiratory distress, Lung sounds clear, patient is on room air  CV:  Auscultation of heart done, rate and rhythm controlled and regular, no murmur, no rub or gallop. Edema none bilateral lower extremities. VASCULAR: no open areas lower legs  ABDOMEN:  normal bowel sounds, soft, nontender, no palpable masses.  M/S:   Gait and station bedbound and wheelchair bound, no tenderness or swelling of the joints; generalized LE weakness, digits normal  SKIN:  Inspection and palpation of skin and subcutaneous tissue: skin on extremities warm, dry and intact without rashes  NEURO: cranial nerves 2-12 grossly intact, no facial asymmetry, no speech deficits and able to follow directions, reports bilateral LE paresthesia   PSYCH:  insight and " judgement and memory at baseline, affect and mood sad and tearful    Lab/Diagnostic data:  Most Recent 3 CBC's:  Recent Labs   Lab Test 10/16/19  0607 10/15/19  2305 10/15/19  0735   WBC 12.0* 16.3* 17.4*   HGB 8.5* 9.8* 11.0*   MCV 86 86 86    425 422     Most Recent 3 BMP's:  Recent Labs   Lab Test 10/16/19  0607 10/15/19  2305 10/15/19  0735    131* 131*   POTASSIUM 3.7 3.8 3.9   CHLORIDE 106 99 99   CO2 27 28 26   BUN 15 22 13   CR 0.63 1.16* 0.61   ANIONGAP 4 4 6   SIXTO 7.6* 8.1* 8.9   * 130* 100*       ASSESSMENT/PLAN:  Hypotension, unspecified hypotension type  Essential hypertension  Acute low BP with hx HTN, now with IV fluids has resolved, meds as above. Monitor vs, wt. Hold BP meds per parameters. F/U next week     Urinary tract infection  New onset. F/U with UC results tomorrow to see if final culture/sensitivities available. CBC with diff on 10/18    Acute kidney injury (H)  Hyponatremia  Acute. Improved with fluids. BMP on 10/18 and avoid nephrotoxic meds.     S/P spinal surgery  Ongoing issues - pain meds as above. Therapies - f/u with status next week. F/U Dr Vasquez as planned 6 weeks post op.     Anemia due to blood loss, acute  Acute, decreased Hgb last check. Repeat CBC tomorrow and follow up with result.     Hypothyroidism  Chronic, synthroid as PTA.     Mild intermittent asthma  By history, no symptoms. Monitor,      Orders written by provider at facility  1. Incentive spirometry 10 reps QID diagnosis fever  2. CBC with diff, BMP on 10/18 diagnosis UTI, hypotension  3. Please check if UA/UC final results are back and whether sensitive to Bactrim or needs another antibiotic  4. ACP consult diagnosis depressed    Total time spent with patient visit at the skilled nursing facility was 36 min including patient visit, review of past records and conversation with staff to coordinate care. Greater than 50% of total time spent with counseling and coordinating care due to counseling  the patient regarding UC results, treatment and needed follow up (labs, final UC review), review of current functional ability and therapy goals, review of BPs and plan to monitor/hold meds as needed, offered in house psych consult due to changes in mood, communicated with staff regarding needed follow up labs and monitoring as well as treatments for fever..     Electronically signed by:  JAZMIN Britton CNP                         Sincerely,        JAZMIN Britton CNP     [phone]

## 2022-02-02 NOTE — ASU DISCHARGE PLAN (ADULT/PEDIATRIC) - ASU DC SPECIAL INSTRUCTIONSFT
You received IV Tylenol for pain management at 9:15am and IV Toradol at 9:30am. Please DO NOT take any Tylenol (Acetaminophen) containing products, such as Vicodin, Percocet, Excedrin, and cold medications for the next 6 hours (until 3:15PM). DO NOT TAKE MORE THAN 3000 MG OF TYLENOL in a 24 hour period. Please DO NOT take any ibuprofen until 3:30PM.

## 2022-02-02 NOTE — ASU DISCHARGE PLAN (ADULT/PEDIATRIC) - NS MD DC FALL RISK RISK
For information on Fall & Injury Prevention, visit: [email or web address] OR  [email or web address] OR  https://www.cdc.gov/steadi/patient.html

## 2022-02-07 LAB — SURGICAL PATHOLOGY STUDY: SIGNIFICANT CHANGE UP

## 2022-02-07 NOTE — H&P PST ADULT - NEUROLOGICAL
Pt woke up a few times during the night c/o insomnia,, toothe ache and the most recent was c/o and appeared restless and anxious and was offered and given prn ativan VS appeared unremarkable and pt went on back to bed. Will continue to assess and monitor pt.    negative Alert & oriented; no sensory, motor or coordination deficits, normal reflexes

## 2022-12-19 ENCOUNTER — APPOINTMENT (OUTPATIENT)
Dept: UROGYNECOLOGY | Facility: CLINIC | Age: 57
End: 2022-12-19

## 2023-03-26 ENCOUNTER — EMERGENCY (EMERGENCY)
Facility: HOSPITAL | Age: 58
LOS: 1 days | Discharge: HOME | End: 2023-03-26
Attending: EMERGENCY MEDICINE
Payer: COMMERCIAL

## 2023-03-26 VITALS
RESPIRATION RATE: 17 BRPM | DIASTOLIC BLOOD PRESSURE: 61 MMHG | OXYGEN SATURATION: 98 % | HEART RATE: 67 BPM | SYSTOLIC BLOOD PRESSURE: 123 MMHG

## 2023-03-26 VITALS
RESPIRATION RATE: 26 BRPM | DIASTOLIC BLOOD PRESSURE: 93 MMHG | HEIGHT: 61 IN | HEART RATE: 76 BPM | OXYGEN SATURATION: 97 % | SYSTOLIC BLOOD PRESSURE: 194 MMHG | WEIGHT: 197.98 LBS | TEMPERATURE: 98 F

## 2023-03-26 DIAGNOSIS — Z98.890 OTHER SPECIFIED POSTPROCEDURAL STATES: Chronic | ICD-10-CM

## 2023-03-26 DIAGNOSIS — Z90.49 ACQUIRED ABSENCE OF OTHER SPECIFIED PARTS OF DIGESTIVE TRACT: Chronic | ICD-10-CM

## 2023-03-26 LAB
ALBUMIN SERPL ELPH-MCNC: 4.3 G/DL — SIGNIFICANT CHANGE UP (ref 3.3–5)
ALP SERPL-CCNC: 81 U/L — SIGNIFICANT CHANGE UP (ref 40–120)
ALT FLD-CCNC: 23 U/L — SIGNIFICANT CHANGE UP (ref 10–45)
ANION GAP SERPL CALC-SCNC: 14 MMOL/L — SIGNIFICANT CHANGE UP (ref 5–17)
APPEARANCE UR: CLEAR — SIGNIFICANT CHANGE UP
AST SERPL-CCNC: 27 U/L — SIGNIFICANT CHANGE UP (ref 10–40)
BASOPHILS # BLD AUTO: 0.04 K/UL — SIGNIFICANT CHANGE UP (ref 0–0.2)
BASOPHILS NFR BLD AUTO: 0.5 % — SIGNIFICANT CHANGE UP (ref 0–2)
BILIRUB SERPL-MCNC: 0.4 MG/DL — SIGNIFICANT CHANGE UP (ref 0.2–1.2)
BILIRUB UR-MCNC: NEGATIVE — SIGNIFICANT CHANGE UP
BUN SERPL-MCNC: 9 MG/DL — SIGNIFICANT CHANGE UP (ref 7–23)
CALCIUM SERPL-MCNC: 9.7 MG/DL — SIGNIFICANT CHANGE UP (ref 8.4–10.5)
CHLORIDE SERPL-SCNC: 103 MMOL/L — SIGNIFICANT CHANGE UP (ref 96–108)
CO2 SERPL-SCNC: 22 MMOL/L — SIGNIFICANT CHANGE UP (ref 22–31)
COLOR SPEC: COLORLESS — SIGNIFICANT CHANGE UP
CREAT SERPL-MCNC: 0.62 MG/DL — SIGNIFICANT CHANGE UP (ref 0.5–1.3)
D DIMER BLD IA.RAPID-MCNC: 161 NG/ML DDU — SIGNIFICANT CHANGE UP
DIFF PNL FLD: NEGATIVE — SIGNIFICANT CHANGE UP
EGFR: 103 ML/MIN/1.73M2 — SIGNIFICANT CHANGE UP
EOSINOPHIL # BLD AUTO: 0.05 K/UL — SIGNIFICANT CHANGE UP (ref 0–0.5)
EOSINOPHIL NFR BLD AUTO: 0.7 % — SIGNIFICANT CHANGE UP (ref 0–6)
GLUCOSE SERPL-MCNC: 96 MG/DL — SIGNIFICANT CHANGE UP (ref 70–99)
GLUCOSE UR QL: NEGATIVE — SIGNIFICANT CHANGE UP
HCT VFR BLD CALC: 40.6 % — SIGNIFICANT CHANGE UP (ref 34.5–45)
HGB BLD-MCNC: 13.3 G/DL — SIGNIFICANT CHANGE UP (ref 11.5–15.5)
IMM GRANULOCYTES NFR BLD AUTO: 0.3 % — SIGNIFICANT CHANGE UP (ref 0–0.9)
KETONES UR-MCNC: NEGATIVE — SIGNIFICANT CHANGE UP
LEUKOCYTE ESTERASE UR-ACNC: NEGATIVE — SIGNIFICANT CHANGE UP
LYMPHOCYTES # BLD AUTO: 2.29 K/UL — SIGNIFICANT CHANGE UP (ref 1–3.3)
LYMPHOCYTES # BLD AUTO: 31.3 % — SIGNIFICANT CHANGE UP (ref 13–44)
MCHC RBC-ENTMCNC: 29.4 PG — SIGNIFICANT CHANGE UP (ref 27–34)
MCHC RBC-ENTMCNC: 32.8 GM/DL — SIGNIFICANT CHANGE UP (ref 32–36)
MCV RBC AUTO: 89.6 FL — SIGNIFICANT CHANGE UP (ref 80–100)
MONOCYTES # BLD AUTO: 0.52 K/UL — SIGNIFICANT CHANGE UP (ref 0–0.9)
MONOCYTES NFR BLD AUTO: 7.1 % — SIGNIFICANT CHANGE UP (ref 2–14)
NEUTROPHILS # BLD AUTO: 4.4 K/UL — SIGNIFICANT CHANGE UP (ref 1.8–7.4)
NEUTROPHILS NFR BLD AUTO: 60.1 % — SIGNIFICANT CHANGE UP (ref 43–77)
NITRITE UR-MCNC: NEGATIVE — SIGNIFICANT CHANGE UP
NRBC # BLD: 0 /100 WBCS — SIGNIFICANT CHANGE UP (ref 0–0)
PH UR: 7 — SIGNIFICANT CHANGE UP (ref 5–8)
PLATELET # BLD AUTO: 304 K/UL — SIGNIFICANT CHANGE UP (ref 150–400)
POTASSIUM SERPL-MCNC: 4.3 MMOL/L — SIGNIFICANT CHANGE UP (ref 3.5–5.3)
POTASSIUM SERPL-SCNC: 4.3 MMOL/L — SIGNIFICANT CHANGE UP (ref 3.5–5.3)
PROT SERPL-MCNC: 7.7 G/DL — SIGNIFICANT CHANGE UP (ref 6–8.3)
PROT UR-MCNC: NEGATIVE — SIGNIFICANT CHANGE UP
RBC # BLD: 4.53 M/UL — SIGNIFICANT CHANGE UP (ref 3.8–5.2)
RBC # FLD: 13 % — SIGNIFICANT CHANGE UP (ref 10.3–14.5)
SODIUM SERPL-SCNC: 139 MMOL/L — SIGNIFICANT CHANGE UP (ref 135–145)
SP GR SPEC: 1 — LOW (ref 1.01–1.02)
TROPONIN T, HIGH SENSITIVITY RESULT: <6 NG/L — SIGNIFICANT CHANGE UP (ref 0–51)
UROBILINOGEN FLD QL: NEGATIVE — SIGNIFICANT CHANGE UP
WBC # BLD: 7.32 K/UL — SIGNIFICANT CHANGE UP (ref 3.8–10.5)
WBC # FLD AUTO: 7.32 K/UL — SIGNIFICANT CHANGE UP (ref 3.8–10.5)

## 2023-03-26 PROCEDURE — 93005 ELECTROCARDIOGRAM TRACING: CPT

## 2023-03-26 PROCEDURE — 84484 ASSAY OF TROPONIN QUANT: CPT

## 2023-03-26 PROCEDURE — 71045 X-RAY EXAM CHEST 1 VIEW: CPT

## 2023-03-26 PROCEDURE — 80053 COMPREHEN METABOLIC PANEL: CPT

## 2023-03-26 PROCEDURE — 84443 ASSAY THYROID STIM HORMONE: CPT

## 2023-03-26 PROCEDURE — 0225U NFCT DS DNA&RNA 21 SARSCOV2: CPT

## 2023-03-26 PROCEDURE — 36415 COLL VENOUS BLD VENIPUNCTURE: CPT

## 2023-03-26 PROCEDURE — 99285 EMERGENCY DEPT VISIT HI MDM: CPT

## 2023-03-26 PROCEDURE — 71045 X-RAY EXAM CHEST 1 VIEW: CPT | Mod: 26

## 2023-03-26 PROCEDURE — 81003 URINALYSIS AUTO W/O SCOPE: CPT

## 2023-03-26 PROCEDURE — 85025 COMPLETE CBC W/AUTO DIFF WBC: CPT

## 2023-03-26 PROCEDURE — 99285 EMERGENCY DEPT VISIT HI MDM: CPT | Mod: 25

## 2023-03-26 PROCEDURE — 85379 FIBRIN DEGRADATION QUANT: CPT

## 2023-03-26 RX ORDER — KETOROLAC TROMETHAMINE 30 MG/ML
30 SYRINGE (ML) INJECTION ONCE
Refills: 0 | Status: COMPLETED | OUTPATIENT
Start: 2023-03-26 | End: 2023-03-26

## 2023-03-26 NOTE — ED PROVIDER NOTE - PATIENT PORTAL LINK FT
You can access the FollowMyHealth Patient Portal offered by [facility] by registering at the following website: [email or web address]. By joining [facility]’s FollowMyHealth portal, you will also be able to view your health information using other applications (apps) compatible with our system.

## 2023-03-26 NOTE — ED ADULT NURSE NOTE - NSFALLRSKINDICATORS_ED_ALL_ED
Daily Note     Today's date: 2022  Patient name: Edil Dumas  : 1952  MRN: [id number]  Referring provider: Shashi Gibson DO  Dx:   Encounter Diagnosis     ICD-10-CM    1  Chronic pain of both shoulders  M25 511     G89 29     M25 512                   Subjective: Patient reports pain is not bad today      Objective: See treatment diary below      Assessment: Tolerated treatment well  She has less trunk rotation and HABD to the left side than to the right side  Plan: Continue per plan of care            Eval/ Re-eval Auth #/ Referral # Total visits Start date  Expiration date Total active units  Total manual units  PT only or  PT+OT?   10/19/22                                     Date: 10/19 10/24 10/26 10/31 11/2 11/7 11/9    Total units authorized  Active:            Manual:           Total units remaining  Active:               Manual:                Date:           Total units authorized  Active:            Manual:           Total units remaining  Active:               Manual:               Precautions: Hypertension  Specialty Daily Treatment Diary       Specialty Daily Treatment Diary       Manuals 22      Visit # 6 7      STM UT/ MFR pec monor 4' bilat UT / LS 4'      PROM shld 4' bilat shld 4'      ST joint mobs 2' Left STJ into retraction 2'      Tennis ball         Warm-up        NuStep 5'  6'      Neuro Re-Ed        Posture correct reviewed --      Scap squeeze 20 20      Door way stretch 10x 10"       Cervical retraction --       Open books - seated  10x each                      Ther Ex        TB row 20   YTB 20   RTB      TB ext 20   YTB 20   RTB      Bilateral wall slide 20 w yellow loop at wrists 20 yellow loop      Pulleys 20 20      AROM ER 20 20      S/L rotation 5x ea       Clasped flex in supine 20 20              Ther Activity                                Modalities        MH deferred
no

## 2023-03-26 NOTE — ED ADULT NURSE NOTE - CHIEF COMPLAINT QUOTE
SOB, back pain since Thursday (plane ride back from [place]). Uncomfortable in triage, taking small shallow breaths

## 2023-03-26 NOTE — ED ADULT TRIAGE NOTE - GLASGOW COMA SCALE: SCORE, MLM
15 Decreased/anxiety related to hospital/ treatment/Increased/knowledge of hospitalization and/ or illness/coping/ adjustment

## 2023-03-26 NOTE — ED ADULT NURSE NOTE - NSIMPLEMENTINTERV_GEN_ALL_ED
Implemented All Universal Safety Interventions:  Teller to call system. Call bell, personal items and telephone within reach. Instruct patient to call for assistance. Room bathroom lighting operational. Non-slip footwear when patient is off stretcher. Physically safe environment: no spills, clutter or unnecessary equipment. Stretcher in lowest position, wheels locked, appropriate side rails in place.

## 2023-03-26 NOTE — ED PROVIDER NOTE - CONSTITUTIONAL, MLM
normal... Well appearing morbidly , obese  awake, alert, oriented to person, place, time/situation and in no apparent distress.

## 2023-03-26 NOTE — ED PROVIDER NOTE - CLINICAL SUMMARY MEDICAL DECISION MAKING FREE TEXT BOX
57-year-old female with right-sided pleuritic chest pain and 1 episode of shortness of breath concerning for pulmonary embolus   patient looks good vital signs are normal, not hypoxic, EKG is normal sinus rhythm no acute changes   , will obtain D-dimers chest xray  Toradol and reasses ZR 57-year-old female with right-sided pleuritic chest pain and 1 episode of shortness of breath concerning for pulmonary embolus vs viral pleurodynia , has cold symptoms    patient looks good vital signs are normal, not hypoxic, EKG is normal sinus rhythm no acute changes   , will obtain D-dimers chest xray  Toradol and reasses ZR

## 2023-03-26 NOTE — ED ADULT NURSE REASSESSMENT NOTE - NS ED NURSE REASSESS COMMENT FT1
report received from [facility] COVERAGE RN HAILEY HENDRICKSON, A&Ox4, NSR, no acute distress noted, pending lab and xray results

## 2023-03-26 NOTE — ED PROVIDER NOTE - OBJECTIVE STATEMENT
57-year-old morbidly obese female with a history of hypothyroidism chronic UTI, on Augmentin for 4-month, came in complaining of right-sided pleuritic chest pain and episode of shortness of breath on Thursday, seen today at [facility] and was referred to emergency room to rule out pulmonary embolus,   patient was traveling 3 hours from [place] on Friday and episodes of shortness of breath she had on Thursday   she denies cough, fever, chills , cold symptoms

## 2023-03-26 NOTE — ED ADULT TRIAGE NOTE - CHIEF COMPLAINT QUOTE
SOB, back pain since Thursday (plane ride back from [place]). Uncomfortable in triage, taking small shallow breaths

## 2023-03-26 NOTE — ED ADULT NURSE NOTE - OBJECTIVE STATEMENT
58 y/o AxOx4 F, arrived from home complaining of SOB and back pain since Thursday, worsening today. PMH asthma (recently starting taking Montelukast), HTN, chronic UTI on Augmentin. Pt stated she returned back from [place] Friday, however symptoms started Thursday. Pt stated she went to doctor and was sent to [facility] ED to r/o PE. Upon assessment, pt endorses SOB that is constant and does not worsen with exertion. Pt denies chest pain, HA, N/V/D, fevers/chils. Pt stated  is currently sick at home with viral illness. Pt on CM NSR and O2 saturation 100% RA.

## 2023-03-26 NOTE — ED PROVIDER NOTE - PROGRESS NOTE DETAILS
Alexandra, PGY2: Patient endorses  is a sick contact at home. Will eval w viral swab as well. Alexandra, PGY2: Patient reevaluated status post work-up.  Work-up unremarkable at this time, and patient made aware.  Patient endorses improvement of shortness of breath at this time.  Encouraged patient to follow-up with her primary care doctor with cardiology for further management.  Unlikely to have PE or cardiac event at this time.  RVP/COVID swab pending patient aware understands to follow it up. Will discharge with return precautions.

## 2023-03-26 NOTE — ED PROVIDER NOTE - NSFOLLOWUPINSTRUCTIONS_ED_ALL_ED_FT
You were seen for shortness of breath.  Please find your results attached to this document.    Follow-up with your primary care doctor and cardiologist for further management.  Address the elevated blood pressure.    Viral swab results pending.  May access patient portal to follow-up results.

## 2023-03-27 LAB
RAPID RVP RESULT: SIGNIFICANT CHANGE UP
SARS-COV-2 RNA SPEC QL NAA+PROBE: SIGNIFICANT CHANGE UP
TSH SERPL-MCNC: 1.57 UIU/ML — SIGNIFICANT CHANGE UP (ref 0.27–4.2)

## 2023-07-07 ENCOUNTER — EMERGENCY (EMERGENCY)
Facility: HOSPITAL | Age: 58
LOS: 1 days | Discharge: HOME | End: 2023-07-07
Attending: EMERGENCY MEDICINE
Payer: COMMERCIAL

## 2023-07-07 VITALS
RESPIRATION RATE: 18 BRPM | SYSTOLIC BLOOD PRESSURE: 152 MMHG | HEART RATE: 102 BPM | OXYGEN SATURATION: 98 % | TEMPERATURE: 99 F | WEIGHT: 190.04 LBS | HEIGHT: 62 IN | DIASTOLIC BLOOD PRESSURE: 87 MMHG

## 2023-07-07 DIAGNOSIS — Z90.49 ACQUIRED ABSENCE OF OTHER SPECIFIED PARTS OF DIGESTIVE TRACT: Chronic | ICD-10-CM

## 2023-07-07 DIAGNOSIS — Z98.890 OTHER SPECIFIED POSTPROCEDURAL STATES: Chronic | ICD-10-CM

## 2023-07-07 LAB
ALBUMIN SERPL ELPH-MCNC: 4.2 G/DL — SIGNIFICANT CHANGE UP (ref 3.3–5)
ALP SERPL-CCNC: 86 U/L — SIGNIFICANT CHANGE UP (ref 40–120)
ALT FLD-CCNC: 16 U/L — SIGNIFICANT CHANGE UP (ref 10–45)
ANION GAP SERPL CALC-SCNC: 15 MMOL/L — SIGNIFICANT CHANGE UP (ref 5–17)
APPEARANCE UR: CLEAR — SIGNIFICANT CHANGE UP
AST SERPL-CCNC: 14 U/L — SIGNIFICANT CHANGE UP (ref 10–40)
BACTERIA # UR AUTO: NEGATIVE — SIGNIFICANT CHANGE UP
BASE EXCESS BLDV CALC-SCNC: 1.5 MMOL/L — SIGNIFICANT CHANGE UP (ref -2–3)
BASOPHILS # BLD AUTO: 0.05 K/UL — SIGNIFICANT CHANGE UP (ref 0–0.2)
BASOPHILS NFR BLD AUTO: 0.3 % — SIGNIFICANT CHANGE UP (ref 0–2)
BILIRUB SERPL-MCNC: 0.8 MG/DL — SIGNIFICANT CHANGE UP (ref 0.2–1.2)
BILIRUB UR-MCNC: NEGATIVE — SIGNIFICANT CHANGE UP
BLOOD GAS VENOUS - CREATININE: SIGNIFICANT CHANGE UP MG/DL (ref 0.5–1.3)
BUN SERPL-MCNC: 8 MG/DL — SIGNIFICANT CHANGE UP (ref 7–23)
CA-I SERPL-SCNC: 1.21 MMOL/L — SIGNIFICANT CHANGE UP (ref 1.15–1.33)
CALCIUM SERPL-MCNC: 9.8 MG/DL — SIGNIFICANT CHANGE UP (ref 8.4–10.5)
CHLORIDE BLDV-SCNC: 102 MMOL/L — SIGNIFICANT CHANGE UP (ref 96–108)
CHLORIDE SERPL-SCNC: 100 MMOL/L — SIGNIFICANT CHANGE UP (ref 96–108)
CO2 BLDV-SCNC: 28 MMOL/L — HIGH (ref 22–26)
CO2 SERPL-SCNC: 23 MMOL/L — SIGNIFICANT CHANGE UP (ref 22–31)
COLOR SPEC: YELLOW — SIGNIFICANT CHANGE UP
CREAT SERPL-MCNC: 0.7 MG/DL — SIGNIFICANT CHANGE UP (ref 0.5–1.3)
DIFF PNL FLD: NEGATIVE — SIGNIFICANT CHANGE UP
EGFR: 100 ML/MIN/1.73M2 — SIGNIFICANT CHANGE UP
EOSINOPHIL # BLD AUTO: 0.01 K/UL — SIGNIFICANT CHANGE UP (ref 0–0.5)
EOSINOPHIL NFR BLD AUTO: 0.1 % — SIGNIFICANT CHANGE UP (ref 0–6)
EPI CELLS # UR: 2 /HPF — SIGNIFICANT CHANGE UP
GAS PNL BLDV: 135 MMOL/L — LOW (ref 136–145)
GAS PNL BLDV: SIGNIFICANT CHANGE UP
GAS PNL BLDV: SIGNIFICANT CHANGE UP
GLUCOSE BLDV-MCNC: 110 MG/DL — HIGH (ref 70–99)
GLUCOSE SERPL-MCNC: 115 MG/DL — HIGH (ref 70–99)
GLUCOSE UR QL: NEGATIVE — SIGNIFICANT CHANGE UP
HCG SERPL-ACNC: <2 MIU/ML — SIGNIFICANT CHANGE UP
HCO3 BLDV-SCNC: 27 MMOL/L — SIGNIFICANT CHANGE UP (ref 22–29)
HCT VFR BLD CALC: 40.1 % — SIGNIFICANT CHANGE UP (ref 34.5–45)
HCT VFR BLDA CALC: 41 % — SIGNIFICANT CHANGE UP (ref 34.5–46.5)
HGB BLD CALC-MCNC: 13.8 G/DL — SIGNIFICANT CHANGE UP (ref 11.7–16.1)
HGB BLD-MCNC: 13.2 G/DL — SIGNIFICANT CHANGE UP (ref 11.5–15.5)
HYALINE CASTS # UR AUTO: 2 /LPF — SIGNIFICANT CHANGE UP (ref 0–2)
IMM GRANULOCYTES NFR BLD AUTO: 0.5 % — SIGNIFICANT CHANGE UP (ref 0–0.9)
KETONES UR-MCNC: NEGATIVE — SIGNIFICANT CHANGE UP
LACTATE BLDV-MCNC: 1.8 MMOL/L — SIGNIFICANT CHANGE UP (ref 0.5–2)
LEUKOCYTE ESTERASE UR-ACNC: NEGATIVE — SIGNIFICANT CHANGE UP
LIDOCAIN IGE QN: 16 U/L — SIGNIFICANT CHANGE UP (ref 7–60)
LYMPHOCYTES # BLD AUTO: 15.8 % — SIGNIFICANT CHANGE UP (ref 13–44)
LYMPHOCYTES # BLD AUTO: 2.45 K/UL — SIGNIFICANT CHANGE UP (ref 1–3.3)
MCHC RBC-ENTMCNC: 29.5 PG — SIGNIFICANT CHANGE UP (ref 27–34)
MCHC RBC-ENTMCNC: 32.9 GM/DL — SIGNIFICANT CHANGE UP (ref 32–36)
MCV RBC AUTO: 89.5 FL — SIGNIFICANT CHANGE UP (ref 80–100)
MONOCYTES # BLD AUTO: 0.9 K/UL — SIGNIFICANT CHANGE UP (ref 0–0.9)
MONOCYTES NFR BLD AUTO: 5.8 % — SIGNIFICANT CHANGE UP (ref 2–14)
NEUTROPHILS # BLD AUTO: 12.03 K/UL — HIGH (ref 1.8–7.4)
NEUTROPHILS NFR BLD AUTO: 77.5 % — HIGH (ref 43–77)
NITRITE UR-MCNC: NEGATIVE — SIGNIFICANT CHANGE UP
NRBC # BLD: 0 /100 WBCS — SIGNIFICANT CHANGE UP (ref 0–0)
PCO2 BLDV: 43 MMHG — HIGH (ref 39–42)
PH BLDV: 7.4 — SIGNIFICANT CHANGE UP (ref 7.32–7.43)
PH UR: 6 — SIGNIFICANT CHANGE UP (ref 5–8)
PLATELET # BLD AUTO: 303 K/UL — SIGNIFICANT CHANGE UP (ref 150–400)
PO2 BLDV: 35 MMHG — SIGNIFICANT CHANGE UP (ref 25–45)
POTASSIUM BLDV-SCNC: 4 MMOL/L — SIGNIFICANT CHANGE UP (ref 3.5–5.1)
POTASSIUM SERPL-MCNC: 3.9 MMOL/L — SIGNIFICANT CHANGE UP (ref 3.5–5.3)
POTASSIUM SERPL-SCNC: 3.9 MMOL/L — SIGNIFICANT CHANGE UP (ref 3.5–5.3)
PROT SERPL-MCNC: 8 G/DL — SIGNIFICANT CHANGE UP (ref 6–8.3)
PROT UR-MCNC: ABNORMAL
RBC # BLD: 4.48 M/UL — SIGNIFICANT CHANGE UP (ref 3.8–5.2)
RBC # FLD: 13 % — SIGNIFICANT CHANGE UP (ref 10.3–14.5)
RBC CASTS # UR COMP ASSIST: 4 /HPF — SIGNIFICANT CHANGE UP (ref 0–4)
SAO2 % BLDV: 51.7 % — LOW (ref 67–88)
SODIUM SERPL-SCNC: 138 MMOL/L — SIGNIFICANT CHANGE UP (ref 135–145)
SP GR SPEC: 1.01 — SIGNIFICANT CHANGE UP (ref 1.01–1.02)
TROPONIN T, HIGH SENSITIVITY RESULT: <6 NG/L — SIGNIFICANT CHANGE UP (ref 0–51)
UROBILINOGEN FLD QL: NEGATIVE — SIGNIFICANT CHANGE UP
WBC # BLD: 15.51 K/UL — HIGH (ref 3.8–10.5)
WBC # FLD AUTO: 15.51 K/UL — HIGH (ref 3.8–10.5)
WBC UR QL: 3 /HPF — SIGNIFICANT CHANGE UP (ref 0–5)

## 2023-07-07 PROCEDURE — 93005 ELECTROCARDIOGRAM TRACING: CPT

## 2023-07-07 PROCEDURE — 84132 ASSAY OF SERUM POTASSIUM: CPT

## 2023-07-07 PROCEDURE — 99285 EMERGENCY DEPT VISIT HI MDM: CPT | Mod: 25

## 2023-07-07 PROCEDURE — 87086 URINE CULTURE/COLONY COUNT: CPT

## 2023-07-07 PROCEDURE — 71046 X-RAY EXAM CHEST 2 VIEWS: CPT

## 2023-07-07 PROCEDURE — 74177 CT ABD & PELVIS W/CONTRAST: CPT | Mod: MA

## 2023-07-07 PROCEDURE — 71046 X-RAY EXAM CHEST 2 VIEWS: CPT | Mod: 26

## 2023-07-07 PROCEDURE — 96375 TX/PRO/DX INJ NEW DRUG ADDON: CPT

## 2023-07-07 PROCEDURE — 84295 ASSAY OF SERUM SODIUM: CPT

## 2023-07-07 PROCEDURE — 85014 HEMATOCRIT: CPT

## 2023-07-07 PROCEDURE — 83605 ASSAY OF LACTIC ACID: CPT

## 2023-07-07 PROCEDURE — 82435 ASSAY OF BLOOD CHLORIDE: CPT

## 2023-07-07 PROCEDURE — 74177 CT ABD & PELVIS W/CONTRAST: CPT | Mod: 26,MA

## 2023-07-07 PROCEDURE — 82330 ASSAY OF CALCIUM: CPT

## 2023-07-07 PROCEDURE — 83690 ASSAY OF LIPASE: CPT

## 2023-07-07 PROCEDURE — 82565 ASSAY OF CREATININE: CPT

## 2023-07-07 PROCEDURE — 96374 THER/PROPH/DIAG INJ IV PUSH: CPT | Mod: XU

## 2023-07-07 PROCEDURE — 80053 COMPREHEN METABOLIC PANEL: CPT

## 2023-07-07 PROCEDURE — 84484 ASSAY OF TROPONIN QUANT: CPT

## 2023-07-07 PROCEDURE — 81001 URINALYSIS AUTO W/SCOPE: CPT

## 2023-07-07 PROCEDURE — 82947 ASSAY GLUCOSE BLOOD QUANT: CPT

## 2023-07-07 PROCEDURE — 85018 HEMOGLOBIN: CPT

## 2023-07-07 PROCEDURE — 84702 CHORIONIC GONADOTROPIN TEST: CPT

## 2023-07-07 PROCEDURE — 82803 BLOOD GASES ANY COMBINATION: CPT

## 2023-07-07 PROCEDURE — 99285 EMERGENCY DEPT VISIT HI MDM: CPT

## 2023-07-07 PROCEDURE — 85025 COMPLETE CBC W/AUTO DIFF WBC: CPT

## 2023-07-07 RX ORDER — SODIUM CHLORIDE 9 MG/ML
1000 INJECTION INTRAMUSCULAR; INTRAVENOUS; SUBCUTANEOUS ONCE
Refills: 0 | Status: COMPLETED | OUTPATIENT
Start: 2023-07-07 | End: 2023-07-07

## 2023-07-07 RX ORDER — FAMOTIDINE 10 MG/ML
20 INJECTION INTRAVENOUS ONCE
Refills: 0 | Status: COMPLETED | OUTPATIENT
Start: 2023-07-07 | End: 2023-07-07

## 2023-07-07 RX ORDER — ACETAMINOPHEN 500 MG
1000 TABLET ORAL ONCE
Refills: 0 | Status: COMPLETED | OUTPATIENT
Start: 2023-07-07 | End: 2023-07-07

## 2023-07-07 RX ADMIN — FAMOTIDINE 20 MILLIGRAM(S): 10 INJECTION INTRAVENOUS at 20:32

## 2023-07-07 RX ADMIN — SODIUM CHLORIDE 1000 MILLILITER(S): 9 INJECTION INTRAMUSCULAR; INTRAVENOUS; SUBCUTANEOUS at 20:32

## 2023-07-07 RX ADMIN — Medication 30 MILLILITER(S): at 20:31

## 2023-07-07 RX ADMIN — Medication 400 MILLIGRAM(S): at 20:31

## 2023-07-07 RX ADMIN — Medication 1000 MILLIGRAM(S): at 22:20

## 2023-07-07 NOTE — ED PROVIDER NOTE - OBJECTIVE STATEMENT
States on Wednesday began having severe abdominal pain that is constant and in epigastric region. It is currently 7/10 in severity. She thought it may have been food poisoning and tried gas-x which she states might have provided some relief to the pain. It does not radiate to the back. It is midline. She states she does not drink alcohol. It was accompanied with nausea but no vomiting. Denies fevers chest pain sob hematuria hematochezia numbness tingling. States on Wednesday began having severe abdominal pain that is constant and in epigastric region. It was accompanied with some diarrhea. It is currently 7/10 in severity. She thought it may have been food poisoning and tried gas-x which she states might have provided some relief to the pain. It does not radiate to the back. It is midline. No recent sick contacts, no recent dietary changes. She states she does not drink alcohol. It was accompanied with nausea but no vomiting. Currently the patient is not nauseous. Denies fevers chest pain sob hematuria hematochezia numbness tingling. States on Wednesday began having severe abdominal pain that is constant and in epigastric region. It was accompanied with some diarrhea. It is currently 7/10 in severity. She thought it may have been food poisoning and tried gas-x which she states might have provided some relief to the pain. It does not radiate to the back. It is midline. Reported fever of 101.7 degrees at home yesterday. No recent sick contacts, no recent dietary changes. She states she does not drink alcohol. It was accompanied with nausea but no vomiting. Currently the patient is not nauseous. Denies fevers chest pain sob hematuria hematochezia numbness tingling.

## 2023-07-07 NOTE — ED PROVIDER NOTE - PROGRESS NOTE DETAILS
Attending MD Lopez: Patient re-evaluated and feeling improved.  No acute issues at  this time.  Lab and radiology tests reviewed with patient and .  Patient stable for discharge. Follow up instructions given, importance of follow up emphasized, return to ED parameters reviewed and patient verbalized understanding.  All questions answered, all concerns addressed. When putting in order for augmentin a warning appeared stating the patient has an amoxicillin allergy. Upon talking to patient she has been tolerating amoxicillin and augmentin well for months therefore decision was made to discharge on augmentin

## 2023-07-07 NOTE — ED PROVIDER NOTE - GASTROINTESTINAL NEGATIVE STATEMENT, MLM
epigastric abdominal pain, no bloating, no constipation, some diarrhea, some nausea and no vomiting.

## 2023-07-07 NOTE — ED PROVIDER NOTE - CHIEF COMPLAINT
The patient is a 57y Female with PMHx sig for cholecystectomy 2006, hypothyroidism, asthma, urge/stress incontinence complaining of epigastric abdominal pain

## 2023-07-07 NOTE — ED PROVIDER NOTE - CLINICAL SUMMARY MEDICAL DECISION MAKING FREE TEXT BOX
56 y/o F presenting with epigastric abdominal pain. DDx includes but is not limited to pancreatitis, atypical ACS, . Pending workup of lipase, troponin, ekg, cbc, cmp 56 y/o F presenting with epigastric abdominal pain. DDx includes but is not limited to pancreatitis, atypical ACS, . Pending workup of lipase, troponin, ekg, cbc, cmp, bvg, urinalysis, urine culture, cxr 58 y/o F presenting with epigastric abdominal pain. DDx includes but is not limited to pancreatitis, atypical ACS, . Pending workup of lipase, troponin, ekg, cbc, cmp, bvg, urinalysis, urine culture, cxr. White count elevated to 15.5. CT abdomen pelvis revealed diverticulitis. Plan is to discharge on antibiotics

## 2023-07-07 NOTE — ED PROVIDER NOTE - PATIENT PORTAL LINK FT
You can access the FollowMyHealth Patient Portal offered by [facility] by registering at the following website: [email or web address]. By joining [facility]’s FollowMyHealth portal, you will also be able to view your health information using other applications (apps) compatible with our system.

## 2023-07-07 NOTE — ED ADULT NURSE NOTE - CHIEF COMPLAINT QUOTE
What Type Of Note Output Would You Prefer (Optional)?: Standard Output Hpi Title: Evaluation of Skin Lesions How Severe Are Your Spot(S)?: moderate Have Your Spot(S) Been Treated In The Past?: has not been treated abdominal pain since wednesday  denies N/V/D

## 2023-07-07 NOTE — ED ADULT NURSE NOTE - OBJECTIVE STATEMENT
Pt is a 57y female who presents to [facility] ED c/o of abd pain. Pt endorses on Wednesday afternoon @1500 she was sitting at home drinking a can of iced tea when she felt a sudden sharp pain in her generalized abd region, states pain is predominately near the umbilical region and across the lower abd region, states it "feels like bad gas pain", endorses taking GasX last night with minimal improvement in pain. Pt also endorses developing a fever last night of 101.7F.  PMH of Asthma and Hypothyroidism and PSH cholecystectomy 2006. Pt is A&Ox4 currently denying any HA, visual deficits, SOB, cough, CP, palpitations, urinary symptoms, n/v/d/c. Pt ambulates independently at baseline. Peripheral IV placed by ED RN, 20G in L AC.

## 2023-07-07 NOTE — ED PROVIDER NOTE - ATTENDING CONTRIBUTION TO CARE
Attending MD Lopez: I personally have seen and examined this patient.  Resident note reviewed and agree on plan of care and except where noted.  See below for details.     Seen in [facility]    57F with PMH/PSH including asthma, hypothyroidism, cholecystectomy, stress incontinence presents to the ED with epigastric abdominal pain which started on Wednesday.  Reports associated with nausea and nonbloody or black diarrhea.  Denies vomiting. GasX with minimal relief.  Reports Tmax 101.7 yesterday.  Denies recent travel, sick contacts, EtOH.  Denies chest pain, shortness of breath.  Denies history of diverticulosis/itis.  Reports colonoscopy previously.  Reports has been taking Augmentin for months intermittently.  Reports previous UTIs.  Denies dysuria, hematuria, change in urinary habits including frequency, urgency.     Exam:   General: NAD  HENT: head NCAT, airway patent  Eyes: anicteric, no conjunctival injection   Lungs: lungs CTAB with good inspiratory effort, no wheezing, no rhonchi, no rales  Cardiac: +S1S2, no obvious m/r/g  GI: abdomen soft with +BS, +LUQ tenderness, no rebound, no guarding, ND  : no CVAT  MSK: ranging neck and extremities freely  Neuro: moving all extremities spontaneously, nonfocal  Psych: normal mood and affect     A/P: 57F with LUQ abdominal pain, will evaluate for but not limited to diverticulitis, colitis, gastritis, PUD, will obtain labs, UA, CTAP, CXR, will give analgesia, IVFs, GI cocktail, reassess

## 2023-07-07 NOTE — ED PROVIDER NOTE - NSFOLLOWUPINSTRUCTIONS_ED_ALL_ED_FT
YOU WERE SEEN IN THE ED FOR: left upper abdominal pain    WHILE YOU WERE HERE, YOU HAD: lab work and CT scan.  On CT, you were found to have diverticulitis.  YOU WERE PRESCRIBED: Augmentin  FOLLOW THE INSTRUCTIONS ON THE LABEL/CONTAINER    FOR PAIN, YOU MAY TAKE TYLENOL (ACETAMINOPHEN). FOLLOW THE INSTRUCTIONS ON THE LABEL/CONTAINER.  DO NOT EXCEED 4000MG OF TYLENOL (ACETAMINOPHEN) IN A 24 HOUR PERIOD.    PLEASE FOLLOW UP WITH YOUR PRIVATE PHYSICIAN OR GASTROENTEROLOGIST WITHIN THE NEXT 48 HOURS. BRING COPIES OF YOUR RESULTS.    RETURN TO THE EMERGENCY DEPARTMENT IF YOU EXPERIENCE ANY NEW/CONCERNING/WORSENING SYMPTOMS SUCH AS BUT NOT LIMITED TO: YOU WERE SEEN IN THE ED FOR: left upper abdominal pain    WHILE YOU WERE HERE, YOU HAD: lab work and CT scan.  On CT, you were found to have diverticulitis.  YOU WERE PRESCRIBED: Augmentin  FOLLOW THE INSTRUCTIONS ON THE LABEL/CONTAINER    FOR PAIN, YOU MAY TAKE TYLENOL (ACETAMINOPHEN). FOLLOW THE INSTRUCTIONS ON THE LABEL/CONTAINER.  DO NOT EXCEED 4000MG OF TYLENOL (ACETAMINOPHEN) IN A 24 HOUR PERIOD.    PLEASE FOLLOW UP WITH YOUR PRIVATE PHYSICIAN OR GASTROENTEROLOGIST WITHIN THE NEXT 48 HOURS. BRING COPIES OF YOUR RESULTS.    RETURN TO THE EMERGENCY DEPARTMENT IF YOU EXPERIENCE ANY NEW/CONCERNING/WORSENING SYMPTOMS SUCH AS BUT NOT LIMITED TO:  Worsening abdominal pain, fever, chest pain, shortness of breath, blood in stool

## 2023-07-07 NOTE — ED PROVIDER NOTE - CARE PROVIDER_API CALL
Pt states understanding of d/c instructions and medications. Pt alert and oriented with steady gait upon discharge.       Charlotte Laboy RN  04/19/19 1943
show
Jaime Hankins  Internal Medicine  [address] [place] [address]  Phone: [phone]  Fax: [phone]  Established Patient  Follow Up Time:

## 2023-07-08 VITALS
HEART RATE: 76 BPM | SYSTOLIC BLOOD PRESSURE: 110 MMHG | OXYGEN SATURATION: 96 % | DIASTOLIC BLOOD PRESSURE: 72 MMHG | TEMPERATURE: 98 F | RESPIRATION RATE: 18 BRPM

## 2023-07-09 LAB
CULTURE RESULTS: SIGNIFICANT CHANGE UP
SPECIMEN SOURCE: SIGNIFICANT CHANGE UP

## 2023-12-12 NOTE — DISCUSSION/SUMMARY
[FreeTextEntry1] : 1. Overactive bladder, urgency incontinence, bladder pressure: \par -Continue behavioral and fluid modifications\par -Will restart Solifenacin 5 mg daily in 2 weeks, Rx provided today with refills. Risks and side effects reviewed. Will try biotene for dry mouth as needed\par \par 2. Cystocele: \par -Continue pessary use prn\par \par RTO in 6 mo for follow up, or sooner if issues arise. \par \par \par  None

## 2024-05-20 ENCOUNTER — EMERGENCY (EMERGENCY)
Facility: HOSPITAL | Age: 59
LOS: 1 days | Discharge: HOME | End: 2024-05-20
Attending: EMERGENCY MEDICINE
Payer: COMMERCIAL

## 2024-05-20 VITALS
WEIGHT: 190.04 LBS | OXYGEN SATURATION: 99 % | SYSTOLIC BLOOD PRESSURE: 139 MMHG | DIASTOLIC BLOOD PRESSURE: 61 MMHG | HEART RATE: 72 BPM | HEIGHT: 61 IN | RESPIRATION RATE: 22 BRPM | TEMPERATURE: 98 F

## 2024-05-20 DIAGNOSIS — Z98.890 OTHER SPECIFIED POSTPROCEDURAL STATES: Chronic | ICD-10-CM

## 2024-05-20 DIAGNOSIS — Z90.49 ACQUIRED ABSENCE OF OTHER SPECIFIED PARTS OF DIGESTIVE TRACT: Chronic | ICD-10-CM

## 2024-05-20 PROCEDURE — 99285 EMERGENCY DEPT VISIT HI MDM: CPT

## 2024-05-21 VITALS
SYSTOLIC BLOOD PRESSURE: 142 MMHG | OXYGEN SATURATION: 98 % | TEMPERATURE: 98 F | HEART RATE: 58 BPM | DIASTOLIC BLOOD PRESSURE: 84 MMHG | RESPIRATION RATE: 18 BRPM

## 2024-05-21 LAB
ALBUMIN SERPL ELPH-MCNC: 4.2 G/DL — SIGNIFICANT CHANGE UP (ref 3.3–5)
ALP SERPL-CCNC: 96 U/L — SIGNIFICANT CHANGE UP (ref 40–120)
ALT FLD-CCNC: 21 U/L — SIGNIFICANT CHANGE UP (ref 10–45)
ANION GAP SERPL CALC-SCNC: 14 MMOL/L — SIGNIFICANT CHANGE UP (ref 5–17)
APPEARANCE UR: CLEAR — SIGNIFICANT CHANGE UP
AST SERPL-CCNC: 17 U/L — SIGNIFICANT CHANGE UP (ref 10–40)
BACTERIA # UR AUTO: NEGATIVE /HPF — SIGNIFICANT CHANGE UP
BASOPHILS # BLD AUTO: 0.05 K/UL — SIGNIFICANT CHANGE UP (ref 0–0.2)
BASOPHILS NFR BLD AUTO: 0.5 % — SIGNIFICANT CHANGE UP (ref 0–2)
BILIRUB SERPL-MCNC: 0.4 MG/DL — SIGNIFICANT CHANGE UP (ref 0.2–1.2)
BILIRUB UR-MCNC: NEGATIVE — SIGNIFICANT CHANGE UP
BUN SERPL-MCNC: 22 MG/DL — SIGNIFICANT CHANGE UP (ref 7–23)
CALCIUM SERPL-MCNC: 10.4 MG/DL — SIGNIFICANT CHANGE UP (ref 8.4–10.5)
CAST: 0 /LPF — SIGNIFICANT CHANGE UP (ref 0–4)
CHLORIDE SERPL-SCNC: 100 MMOL/L — SIGNIFICANT CHANGE UP (ref 96–108)
CO2 SERPL-SCNC: 25 MMOL/L — SIGNIFICANT CHANGE UP (ref 22–31)
COLOR SPEC: YELLOW — SIGNIFICANT CHANGE UP
CREAT SERPL-MCNC: 0.87 MG/DL — SIGNIFICANT CHANGE UP (ref 0.5–1.3)
DIFF PNL FLD: NEGATIVE — SIGNIFICANT CHANGE UP
EGFR: 77 ML/MIN/1.73M2 — SIGNIFICANT CHANGE UP
EOSINOPHIL # BLD AUTO: 0.1 K/UL — SIGNIFICANT CHANGE UP (ref 0–0.5)
EOSINOPHIL NFR BLD AUTO: 1.1 % — SIGNIFICANT CHANGE UP (ref 0–6)
GLUCOSE SERPL-MCNC: 94 MG/DL — SIGNIFICANT CHANGE UP (ref 70–99)
GLUCOSE UR QL: NEGATIVE MG/DL — SIGNIFICANT CHANGE UP
HCT VFR BLD CALC: 39.3 % — SIGNIFICANT CHANGE UP (ref 34.5–45)
HGB BLD-MCNC: 12.6 G/DL — SIGNIFICANT CHANGE UP (ref 11.5–15.5)
IMM GRANULOCYTES NFR BLD AUTO: 0.3 % — SIGNIFICANT CHANGE UP (ref 0–0.9)
KETONES UR-MCNC: NEGATIVE MG/DL — SIGNIFICANT CHANGE UP
LEUKOCYTE ESTERASE UR-ACNC: NEGATIVE — SIGNIFICANT CHANGE UP
LIDOCAIN IGE QN: 33 U/L — SIGNIFICANT CHANGE UP (ref 7–60)
LYMPHOCYTES # BLD AUTO: 3.67 K/UL — HIGH (ref 1–3.3)
LYMPHOCYTES # BLD AUTO: 40 % — SIGNIFICANT CHANGE UP (ref 13–44)
MCHC RBC-ENTMCNC: 29.1 PG — SIGNIFICANT CHANGE UP (ref 27–34)
MCHC RBC-ENTMCNC: 32.1 GM/DL — SIGNIFICANT CHANGE UP (ref 32–36)
MCV RBC AUTO: 90.8 FL — SIGNIFICANT CHANGE UP (ref 80–100)
MONOCYTES # BLD AUTO: 0.63 K/UL — SIGNIFICANT CHANGE UP (ref 0–0.9)
MONOCYTES NFR BLD AUTO: 6.9 % — SIGNIFICANT CHANGE UP (ref 2–14)
NEUTROPHILS # BLD AUTO: 4.69 K/UL — SIGNIFICANT CHANGE UP (ref 1.8–7.4)
NEUTROPHILS NFR BLD AUTO: 51.2 % — SIGNIFICANT CHANGE UP (ref 43–77)
NITRITE UR-MCNC: NEGATIVE — SIGNIFICANT CHANGE UP
NRBC # BLD: 0 /100 WBCS — SIGNIFICANT CHANGE UP (ref 0–0)
PH UR: 6 — SIGNIFICANT CHANGE UP (ref 5–8)
PLATELET # BLD AUTO: 280 K/UL — SIGNIFICANT CHANGE UP (ref 150–400)
POTASSIUM SERPL-MCNC: 4.3 MMOL/L — SIGNIFICANT CHANGE UP (ref 3.5–5.3)
POTASSIUM SERPL-SCNC: 4.3 MMOL/L — SIGNIFICANT CHANGE UP (ref 3.5–5.3)
PROT SERPL-MCNC: 7.8 G/DL — SIGNIFICANT CHANGE UP (ref 6–8.3)
PROT UR-MCNC: NEGATIVE MG/DL — SIGNIFICANT CHANGE UP
RBC # BLD: 4.33 M/UL — SIGNIFICANT CHANGE UP (ref 3.8–5.2)
RBC # FLD: 13 % — SIGNIFICANT CHANGE UP (ref 10.3–14.5)
RBC CASTS # UR COMP ASSIST: 1 /HPF — SIGNIFICANT CHANGE UP (ref 0–4)
SODIUM SERPL-SCNC: 139 MMOL/L — SIGNIFICANT CHANGE UP (ref 135–145)
SP GR SPEC: 1.02 — SIGNIFICANT CHANGE UP (ref 1–1.03)
SQUAMOUS # UR AUTO: 1 /HPF — SIGNIFICANT CHANGE UP (ref 0–5)
TROPONIN T, HIGH SENSITIVITY RESULT: <6 NG/L — SIGNIFICANT CHANGE UP (ref 0–51)
UROBILINOGEN FLD QL: 0.2 MG/DL — SIGNIFICANT CHANGE UP (ref 0.2–1)
WBC # BLD: 9.17 K/UL — SIGNIFICANT CHANGE UP (ref 3.8–10.5)
WBC # FLD AUTO: 9.17 K/UL — SIGNIFICANT CHANGE UP (ref 3.8–10.5)
WBC UR QL: 1 /HPF — SIGNIFICANT CHANGE UP (ref 0–5)

## 2024-05-21 PROCEDURE — 99285 EMERGENCY DEPT VISIT HI MDM: CPT | Mod: 25

## 2024-05-21 PROCEDURE — 93005 ELECTROCARDIOGRAM TRACING: CPT

## 2024-05-21 PROCEDURE — 83690 ASSAY OF LIPASE: CPT

## 2024-05-21 PROCEDURE — 84484 ASSAY OF TROPONIN QUANT: CPT

## 2024-05-21 PROCEDURE — 87086 URINE CULTURE/COLONY COUNT: CPT

## 2024-05-21 PROCEDURE — 80053 COMPREHEN METABOLIC PANEL: CPT

## 2024-05-21 PROCEDURE — 85025 COMPLETE CBC W/AUTO DIFF WBC: CPT

## 2024-05-21 PROCEDURE — 81001 URINALYSIS AUTO W/SCOPE: CPT

## 2024-05-21 PROCEDURE — 96374 THER/PROPH/DIAG INJ IV PUSH: CPT | Mod: XU

## 2024-05-21 PROCEDURE — 74177 CT ABD & PELVIS W/CONTRAST: CPT | Mod: 26,MC

## 2024-05-21 PROCEDURE — 74177 CT ABD & PELVIS W/CONTRAST: CPT | Mod: MC

## 2024-05-21 RX ORDER — FAMOTIDINE 10 MG/ML
20 INJECTION INTRAVENOUS ONCE
Refills: 0 | Status: COMPLETED | OUTPATIENT
Start: 2024-05-21 | End: 2024-05-21

## 2024-05-21 RX ORDER — LIDOCAINE 4 G/100G
15 CREAM TOPICAL ONCE
Refills: 0 | Status: COMPLETED | OUTPATIENT
Start: 2024-05-21 | End: 2024-05-21

## 2024-05-21 RX ADMIN — LIDOCAINE 15 MILLILITER(S): 4 CREAM TOPICAL at 03:19

## 2024-05-21 RX ADMIN — Medication 30 MILLILITER(S): at 03:19

## 2024-05-21 RX ADMIN — FAMOTIDINE 20 MILLIGRAM(S): 10 INJECTION INTRAVENOUS at 03:18

## 2024-05-21 NOTE — ED PROVIDER NOTE - PATIENT PORTAL LINK FT
You can access the FollowMyHealth Patient Portal offered by [facility] by registering at the following website: [email or web address]. By joining [facility]’s FollowMyHealth portal, you will also be able to view your health information using other applications (apps) compatible with our system.

## 2024-05-21 NOTE — ED PROVIDER NOTE - NSFOLLOWUPINSTRUCTIONS_ED_ALL_ED_FT
You were evaluated for abdominal pain. Your labs and CT scan of your abdomen were unremarkable. It is likely that your pain is caused by the gastritis you were diagnosed with. Please take protonix daily to help with this, along with tums as needed. Please follow up with your PCP and GI doctors for further management.

## 2024-05-21 NOTE — ED PROVIDER NOTE - OBJECTIVE STATEMENT
58F h/o hypothyroid, HLD, gastritis, hx of cholecystectomy 2006 p/w stabbing epigastric chest pain going to back. States it started at 5pm after eating dinner today. Feels similar to pain experienced with gallstones before. No n/v, f/c, diarrhea, palpitations, sweating. Tried taking protonix, sucralfate at home without relief.

## 2024-05-21 NOTE — ED ADULT NURSE NOTE - OBJECTIVE STATEMENT
59y/o Female presents to ED c/o epigastric pain which radiates to her back. Pt states pain started in January of 2024 and has been taking otc antacids. Pt states pain has worsened after dinner and came to the ED. PMHx hypothyroidism, HLD, Asthma. Pt denies headache, f/c/n/v/d, chest pain, SOB. Pt is AxOx4. Pt airway is patent, breathing is shallow and unlabored. Skin is warm, dry, color appropriate for ethnicity. Pt demonstrates continence, is ambulatory unassisted. Stretcher locked and in lowest position, appropriate side rails up. Pt instructed to notify RN if assistance is needed.

## 2024-05-21 NOTE — ED ADULT NURSE NOTE - NSICDXPASTSURGICALHX_GEN_ALL_CORE_FT
----- Message from Erika Cole sent at 11/9/2023  8:59 AM CST -----  Contact: self  Pt is asking for an return call in reference to having orders sent over to [facility] to have mammogram done ,please call back at .[phone] CJ      PAST SURGICAL HISTORY:  History of D&C 2016    S/P cholecystectomy 2006

## 2024-05-21 NOTE — ED PROVIDER NOTE - DIFFERENTIAL DIAGNOSIS
Differential Diagnosis Ddx includes, however, is not limited to: acs, pancreatitis, uti/pyelo, gerd, gastritis, other

## 2024-05-21 NOTE — ED PROVIDER NOTE - PHYSICAL EXAMINATION
PHYSICAL EXAM:  GENERAL: NAD, lying in bed comfortably  HEAD:  Atraumatic, Normocephalic  EYES: EOMI, conjunctiva and sclera clear  ENT: Moist mucous membranes  NECK: Supple  CHEST/LUNG: Clear to auscultation bilaterally; No rales, rhonchi, wheezing, or rubs. Unlabored respirations  HEART: Regular rate and rhythm; No murmurs, rubs, or gallops  ABDOMEN: Bowel sounds present; Soft, Nondistended. TTP to epigastric area. No hepatomegally  EXTREMITIES:  2+ Peripheral Pulses, brisk capillary refill. No clubbing, cyanosis, or edema  NERVOUS SYSTEM:  Alert & Oriented X3, speech clear. No deficits   MSK: FROM all 4 extremities, full and equal strength  SKIN: No rashes or lesions

## 2024-05-21 NOTE — ED ADULT NURSE NOTE - HOW MANY DRINKS CONTAINING ALCOHOL DO YOU HAVE ON A TYPICAL DAY WHEN YOU ARE DRINKING?
Plan of care discussed with MD, who states that the pt does not need a sitter at this point and the cords can stay in the room. Curtain to room opened for visualization. 1 or 2

## 2024-05-21 NOTE — ED PROVIDER NOTE - CLINICAL SUMMARY MEDICAL DECISION MAKING FREE TEXT BOX
58F h/o gastritis, cholecystectomy p/w 7h epigastric stabbing pain going to the back. Started after eating dinner. Suspect pancreatitis vs gastritis. Will check labs, lipase, CTAP, reassess. 58F h/o gastritis, cholecystectomy p/w 7h epigastric stabbing pain going to the back. Started after eating dinner. Suspect pancreatitis vs gastritis. Will check labs, lipase, CTAP, reassess.    TORRI Davidson MD: Agree with resident/ACP MDM, assessment and plan as above.

## 2024-05-22 LAB
CULTURE RESULTS: SIGNIFICANT CHANGE UP
SPECIMEN SOURCE: SIGNIFICANT CHANGE UP

## 2024-07-22 PROBLEM — K57.92 DIVERTICULITIS OF INTESTINE, PART UNSPECIFIED, WITHOUT PERFORATION OR ABSCESS WITHOUT BLEEDING: Chronic | Status: ACTIVE | Noted: 2024-07-17

## 2024-07-22 PROBLEM — E78.5 HYPERLIPIDEMIA, UNSPECIFIED: Chronic | Status: ACTIVE | Noted: 2024-07-17

## 2024-07-22 PROBLEM — J45.909 UNSPECIFIED ASTHMA, UNCOMPLICATED: Chronic | Status: ACTIVE | Noted: 2024-07-17

## 2024-07-22 PROBLEM — K29.50 UNSPECIFIED CHRONIC GASTRITIS WITHOUT BLEEDING: Chronic | Status: ACTIVE | Noted: 2024-07-17

## 2024-07-22 PROBLEM — E03.9 HYPOTHYROIDISM, UNSPECIFIED: Chronic | Status: ACTIVE | Noted: 2024-07-17

## 2024-08-21 ENCOUNTER — APPOINTMENT (OUTPATIENT)
Dept: GASTROENTEROLOGY | Facility: CLINIC | Age: 59
End: 2024-08-21
Payer: COMMERCIAL

## 2024-08-21 VITALS
HEART RATE: 71 BPM | BODY MASS INDEX: 33.79 KG/M2 | WEIGHT: 179 LBS | DIASTOLIC BLOOD PRESSURE: 84 MMHG | OXYGEN SATURATION: 91 % | HEIGHT: 61 IN | SYSTOLIC BLOOD PRESSURE: 131 MMHG

## 2024-08-21 DIAGNOSIS — Z56.0 UNEMPLOYMENT, UNSPECIFIED: ICD-10-CM

## 2024-08-21 DIAGNOSIS — K83.8 OTHER SPECIFIED DISEASES OF BILIARY TRACT: ICD-10-CM

## 2024-08-21 DIAGNOSIS — K85.90 ACUTE PANCREATITIS WITHOUT NECROSIS OR INFECTION, UNSPECIFIED: ICD-10-CM

## 2024-08-21 DIAGNOSIS — Z82.49 FAMILY HISTORY OF ISCHEMIC HEART DISEASE AND OTHER DISEASES OF THE CIRCULATORY SYSTEM: ICD-10-CM

## 2024-08-21 PROCEDURE — 99214 OFFICE O/P EST MOD 30 MIN: CPT

## 2024-08-21 RX ORDER — OMEPRAZOLE 40 MG/1
CAPSULE, DELAYED RELEASE ORAL
Refills: 0 | Status: ACTIVE | COMMUNITY

## 2024-08-21 SDOH — ECONOMIC STABILITY - INCOME SECURITY: UNEMPLOYMENT, UNSPECIFIED: Z56.0

## 2024-08-21 NOTE — ASSESSMENT
[FreeTextEntry1] : 58F with pmhx of hypothyroidism, GERD, IBS, diverticulitis presenting for evaluation for pancreatitis. Pt states has had multiple episodes of epigastric pain and n/v over the last 7 months. Follows with outside GI and underwent EGD showing Palma's esophagus and gastritis. Was attributed to functional dyspepsia symptoms and notes tried multiple medications including omeprazole, carafate, amitriptylline, dicyclomine without relief. Then had severe attack and hospitalized few weeks ago at [facility] found to have acute pancreatitis with elevated lipase. MRCP showed a dilated bile duct without stone seen. Improved with conservative mgmt and now here to evaluate. Also notes hx of diverticulitis last year with colonoscopy subsequently reportedly unremarkable. Currently now feeling better.  - Plan for EUS to evaluate for small retained stone. Discussed however in light of her likely recurrent acute pancreatitis attacks, risks, benefits and alternatives of an ERCP with empiric biliary sphincterotomy (including pancreatitis, infection, bleeding, etc) even if no stone found which she is agreeable to.  - Schedule EUS to r/o underlying lesion and/or stone, ERCP with empiric biliary sphincterotomy.

## 2024-08-21 NOTE — PHYSICAL EXAM

## 2024-08-21 NOTE — HISTORY OF PRESENT ILLNESS
[FreeTextEntry1] : 58F with pmhx of hypothyroidism, GERD, IBS, diverticulitis presenting for evaluation for pancreatitis. Pt states has had multiple episodes of epigastric pain and n/v over the last 7 months. Follows with outside GI and underwent EGD showing Palma's esophagus and gastritis. Was attributed to functional dyspepsia symptoms and notes tried multiple medications including omeprazole, carafate, amitriptylline, dicyclomine without relief. Then had severe attack and hospitalized few weeks ago at [facility] found to have acute pancreatitis with elevated lipase. MRCP showed a dilated bile duct without stone seen. Improved with conservative mgmt and now here to evaluate. Also notes hx of diverticulitis last year with colonoscopy subsequently reportedly unremarkable. Currently now feeling better. Denies any other complaints, no abd pain, n/v/d/c, melena, hematochezia, fever/chills, jaundice, dark urine, or other issues.  PMHx: Above.  Medications: Synthroid, Omeprazole.  Allergies: NKDA. Surgical Hx: Cholecystectomy. SH: Denies tobacco, etoh, or drug use. FH: Denies fhx of GI disorders.

## 2024-09-06 ENCOUNTER — TRANSCRIPTION ENCOUNTER (OUTPATIENT)
Age: 59
End: 2024-09-06

## 2024-09-06 ENCOUNTER — RESULT REVIEW (OUTPATIENT)
Age: 59
End: 2024-09-06

## 2024-09-06 ENCOUNTER — APPOINTMENT (OUTPATIENT)
Dept: GASTROENTEROLOGY | Facility: HOSPITAL | Age: 59
End: 2024-09-06

## 2024-09-06 ENCOUNTER — OUTPATIENT (OUTPATIENT)
Dept: OUTPATIENT SERVICES | Facility: HOSPITAL | Age: 59
LOS: 1 days | Discharge: HOME | End: 2024-09-06
Payer: COMMERCIAL

## 2024-09-06 VITALS
SYSTOLIC BLOOD PRESSURE: 130 MMHG | RESPIRATION RATE: 16 BRPM | HEIGHT: 61 IN | OXYGEN SATURATION: 100 % | HEART RATE: 67 BPM | WEIGHT: 177.03 LBS | DIASTOLIC BLOOD PRESSURE: 77 MMHG | TEMPERATURE: 98 F

## 2024-09-06 VITALS
HEART RATE: 66 BPM | RESPIRATION RATE: 20 BRPM | SYSTOLIC BLOOD PRESSURE: 136 MMHG | OXYGEN SATURATION: 100 % | DIASTOLIC BLOOD PRESSURE: 78 MMHG

## 2024-09-06 DIAGNOSIS — Z98.890 OTHER SPECIFIED POSTPROCEDURAL STATES: Chronic | ICD-10-CM

## 2024-09-06 DIAGNOSIS — Z90.49 ACQUIRED ABSENCE OF OTHER SPECIFIED PARTS OF DIGESTIVE TRACT: Chronic | ICD-10-CM

## 2024-09-06 PROCEDURE — 43239 EGD BIOPSY SINGLE/MULTIPLE: CPT

## 2024-09-06 PROCEDURE — 43262 ENDO CHOLANGIOPANCREATOGRAPH: CPT

## 2024-09-06 PROCEDURE — 43259 EGD US EXAM DUODENUM/JEJUNUM: CPT

## 2024-09-06 PROCEDURE — 88305 TISSUE EXAM BY PATHOLOGIST: CPT | Mod: 26

## 2024-09-06 PROCEDURE — 43264 ERCP REMOVE DUCT CALCULI: CPT

## 2024-09-06 DEVICE — GWIRE JAGTOME REVOLUTION RX 260CM/0.025IN: Type: IMPLANTABLE DEVICE | Status: FUNCTIONAL

## 2024-09-06 DEVICE — HYDRATOME 44: Type: IMPLANTABLE DEVICE | Status: FUNCTIONAL

## 2024-09-06 DEVICE — CATH BLLN EXTRACT DIST GUIDE WIRE 15MM 3LUM: Type: IMPLANTABLE DEVICE | Status: FUNCTIONAL

## 2024-09-06 RX ORDER — OMEPRAZOLE 40 MG/1
1 CAPSULE, DELAYED RELEASE ORAL
Refills: 0 | DISCHARGE

## 2024-09-06 RX ORDER — ACETAMINOPHEN 325 MG/1
1000 TABLET ORAL ONCE
Refills: 0 | Status: COMPLETED | OUTPATIENT
Start: 2024-09-06 | End: 2024-09-06

## 2024-09-06 RX ADMIN — ACETAMINOPHEN 400 MILLIGRAM(S): 325 TABLET ORAL at 16:58

## 2024-09-06 NOTE — ASU PATIENT PROFILE, ADULT - NSICDXPASTMEDICALHX_GEN_ALL_CORE_FT
PAST MEDICAL HISTORY:  Asthma     Chronic gastritis     Diverticulitis     Hyperlipidemia     Hypothyroidism     Obesity

## 2024-09-06 NOTE — PRE PROCEDURE NOTE - PRE PROCEDURE EVALUATION
Attending Physician:                            Procedure: EUS/ERCP    Indication for Procedure: Hx of recurrent pancreatitis with CBD dilation  ________________________________________________________  PAST MEDICAL & SURGICAL HISTORY:  Obesity      Hypothyroidism      Chronic gastritis      Hyperlipidemia      Asthma      Diverticulitis      S/P cholecystectomy  2006      History of D&C  2016        ALLERGIES:  No Known Allergies    HOME MEDICATIONS:  levothyroxine 88 mcg (0.088 mg) oral tablet: 1 tab(s) orally once a day  vonoprazan 10 mg oral tablet: 1 tab(s) orally once a day    AICD/PPM: [ ] yes   [ ] no    PERTINENT LAB DATA:                      PHYSICAL EXAMINATION:    T(C): --  HR: --  BP: --  RR: --  SpO2: --    Constitutional: NAD    Neck:  No JVD  Respiratory: CTAB/L  Cardiovascular: S1 and S2  Gastrointestinal: BS+, soft, NT/ND  Extremities: No peripheral edema  Neurological: A/O x 3, no focal deficits        COMMENTS:    The patient is a suitable candidate for the planned procedure unless box checked [ ]  No, explain:

## 2024-09-10 LAB — SURGICAL PATHOLOGY STUDY: SIGNIFICANT CHANGE UP

## 2024-10-08 ENCOUNTER — APPOINTMENT (OUTPATIENT)
Dept: GASTROENTEROLOGY | Facility: CLINIC | Age: 59
End: 2024-10-08
Payer: COMMERCIAL

## 2024-10-08 DIAGNOSIS — K85.90 ACUTE PANCREATITIS WITHOUT NECROSIS OR INFECTION, UNSPECIFIED: ICD-10-CM

## 2024-10-08 PROCEDURE — 99213 OFFICE O/P EST LOW 20 MIN: CPT | Mod: 95

## 2024-10-08 PROCEDURE — G2211 COMPLEX E/M VISIT ADD ON: CPT | Mod: NC

## 2025-01-28 ENCOUNTER — APPOINTMENT (OUTPATIENT)
Dept: GASTROENTEROLOGY | Facility: CLINIC | Age: 60
End: 2025-01-28
Payer: COMMERCIAL

## 2025-01-28 DIAGNOSIS — K29.00 ACUTE GASTRITIS W/OUT BLEEDING: ICD-10-CM

## 2025-01-28 DIAGNOSIS — K85.90 ACUTE PANCREATITIS WITHOUT NECROSIS OR INFECTION, UNSPECIFIED: ICD-10-CM

## 2025-01-28 DIAGNOSIS — K83.8 OTHER SPECIFIED DISEASES OF BILIARY TRACT: ICD-10-CM

## 2025-01-28 PROCEDURE — 99214 OFFICE O/P EST MOD 30 MIN: CPT | Mod: 95

## 2025-01-28 RX ORDER — OMEPRAZOLE 40 MG/1
40 CAPSULE, DELAYED RELEASE ORAL
Qty: 90 | Refills: 3 | Status: ACTIVE | COMMUNITY
Start: 2025-01-28 | End: 1900-01-01

## 2025-03-04 ENCOUNTER — APPOINTMENT (OUTPATIENT)
Dept: GASTROENTEROLOGY | Facility: CLINIC | Age: 60
End: 2025-03-04
Payer: COMMERCIAL

## 2025-03-04 DIAGNOSIS — K83.8 OTHER SPECIFIED DISEASES OF BILIARY TRACT: ICD-10-CM

## 2025-03-04 DIAGNOSIS — K86.2 CYST OF PANCREAS: ICD-10-CM

## 2025-03-04 PROCEDURE — 99214 OFFICE O/P EST MOD 30 MIN: CPT | Mod: 95
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
## 2025-06-16 ENCOUNTER — APPOINTMENT (OUTPATIENT)
Dept: MRI IMAGING | Facility: CLINIC | Age: 60
End: 2025-06-16

## 2025-06-16 PROCEDURE — A9585: CPT

## 2025-06-16 PROCEDURE — 74183 MRI ABD W/O CNTR FLWD CNTR: CPT

## (undated) DEVICE — DVC AUTO CAP RX LOKG

## (undated) DEVICE — SALIVA EJECTOR (BLUE)

## (undated) DEVICE — PACK IV START WITH CHG

## (undated) DEVICE — DRSG CURITY GAUZE SPONGE 4 X 4" 12-PLY NON-STERILE

## (undated) DEVICE — OLYMPUS DISTAL COVER ENDOSCOPE

## (undated) DEVICE — DRAPE LIGHT HANDLE COVER (GREEN)

## (undated) DEVICE — CABLE DAC ACTIVE CORD

## (undated) DEVICE — SOL IRR POUR NS 0.9% 500ML

## (undated) DEVICE — BALLOON US ENDO

## (undated) DEVICE — ELCTR ECG CONDUCTIVE ADHESIVE

## (undated) DEVICE — DENTURE CUP PINK

## (undated) DEVICE — ESU ERBE 044847: Type: DURABLE MEDICAL EQUIPMENT

## (undated) DEVICE — CATH IV SAFE BC 22G X 1" (BLUE)

## (undated) DEVICE — BASIN EMESIS 10IN GRADUATED MAUVE

## (undated) DEVICE — GOWN LG

## (undated) DEVICE — TUBING SUCTION NONCONDUCTIVE 6MM X 12FT

## (undated) DEVICE — PROTECTOR HEEL / ELBOW FLUFFY

## (undated) DEVICE — SOL IRR BAG NS 0.9% 3000ML

## (undated) DEVICE — SYMPHION FLUID MANAGEMENT DEVICE

## (undated) DEVICE — DRAPE UNDER BUTTOCKS W SCREEN

## (undated) DEVICE — POSITIONER STRAP ARMBOARD VELCRO TS-30

## (undated) DEVICE — SENSOR O2 FINGER ADULT

## (undated) DEVICE — DRAPE TOWEL BLUE 17" X 24"

## (undated) DEVICE — NDL HYPO SAFE 22G X 1" (BLACK)

## (undated) DEVICE — TUBING IRR SET FOR CYSTOSCOPY 77"

## (undated) DEVICE — BITE BLOCK ADULT 20 X 27MM (GREEN)

## (undated) DEVICE — SYR LUER LOK 10CC

## (undated) DEVICE — PREP BETADINE SPONGE STICKS

## (undated) DEVICE — DRSG TELFA 3 X 8

## (undated) DEVICE — SYR LUER LOK 3CC

## (undated) DEVICE — GLV 6.5 PROTEXIS (CREAM) MICRO

## (undated) DEVICE — DRAPE IRRIGATION POUCH 19X23"

## (undated) DEVICE — SYMPHION 3.6MM RESECTING DEVICE

## (undated) DEVICE — OMNIPAQUE 300  30ML

## (undated) DEVICE — VENODYNE/SCD SLEEVE CALF MEDIUM

## (undated) DEVICE — PRESSURE INFUSOR BAG 1000ML

## (undated) DEVICE — DRSG PAD SANITARY OB

## (undated) DEVICE — DRSG 2X2

## (undated) DEVICE — ELCTR CUTTING LOOP RIGHT ANGLE 24FR

## (undated) DEVICE — LABELS BLANK W PEN

## (undated) DEVICE — PACK PERI GYN

## (undated) DEVICE — ELCTR REM POLYHESIVE ADULT PT RETURN 15FT

## (undated) DEVICE — VISITEC 4X4

## (undated) DEVICE — BASIN SET DOUBLE

## (undated) DEVICE — GOWN XL

## (undated) DEVICE — DRSG BANDAID 0.75X3"

## (undated) DEVICE — INJ SYS RAP REFIL

## (undated) DEVICE — UNDERPAD LINEN SAVER 17 X 24"

## (undated) DEVICE — SOL INJ NS 0.9% 500ML 1-PORT